# Patient Record
Sex: MALE | Employment: UNEMPLOYED | URBAN - METROPOLITAN AREA
[De-identification: names, ages, dates, MRNs, and addresses within clinical notes are randomized per-mention and may not be internally consistent; named-entity substitution may affect disease eponyms.]

---

## 2020-09-15 ENCOUNTER — MEDICAL CORRESPONDENCE (OUTPATIENT)
Dept: HEALTH INFORMATION MANAGEMENT | Facility: CLINIC | Age: 25
End: 2020-09-15

## 2020-09-16 NOTE — TELEPHONE ENCOUNTER
DIAGNOSIS: (R) Shoulder tato , Dr. Olivarez @ [facility] reffered , [facility]   APPOINTMENT DATE: 9.25.20   NOTES STATUS DETAILS   OFFICE NOTE from referring provider Care Everywhere 9.15.20 Dr. Perez, [facility] [place] [place]   OFFICE NOTE from other specialist Care Everywhere 6.17.20 Leafblad, [facility] [place] [place]   DISCHARGE SUMMARY from hospital N/A    DISCHARGE REPORT from the ER Care Everywhere 7.29.20 [facility]  5.26.20 [facility]  1.27.20 [facility] [place] [place]  1.17.20 [facility] [place] [place].  5.28.20 [facility]   OPERATIVE REPORT Care Everywhere 9.15.20 [facility] [place] [place]   MEDICATION LIST Care Everywhere    EMG (for Spine) N/A    IMPLANT RECORD/STICKER N/A    LABS     CBC/DIFF Care Everywhere 3.24.19   CULTURES N/A    INJECTIONS DONE IN RADIOLOGY In process 7.2.20 R shoulder, [facility] [place] [place]  1.27.20 R shoulder, [facility] [place] [place]   MRI In process 7.2.20 R shoulder, [facility] [place] [place]   CT SCAN In process 8.5.20 CT upper extremity right, [facility] [place] [place]   XRAYS (IMAGES & REPORTS) In process 7.29.20 R shoulder, [facility]  7.2.20 R shoulder, [facility] [place] [place]  5.26.20 R shoulder, [facility]  3.25.20 R shoulder, [facility]  3.11.20 R shoulder, [facility]  1.27.20 R shoulder, [facility]  5.28.19 R shoulder, [facility]   TUMOR     PATHOLOGY  Slides & report N/A      Action 9.16.20 MJ   Action Taken Requested all images listed above from [facility] [place] [place] and [facility].     Action Macey 9/24/20 12:17PM   Action Taken CSS received three colored photos of pt's Rt shoulder - scanned and sent HIM Urgent scanning

## 2020-09-24 ENCOUNTER — TRANSFERRED RECORDS (OUTPATIENT)
Dept: HEALTH INFORMATION MANAGEMENT | Facility: CLINIC | Age: 25
End: 2020-09-24

## 2020-09-25 ENCOUNTER — OFFICE VISIT (OUTPATIENT)
Dept: ORTHOPEDICS | Facility: CLINIC | Age: 25
End: 2020-09-25
Payer: COMMERCIAL

## 2020-09-25 ENCOUNTER — PRE VISIT (OUTPATIENT)
Dept: ORTHOPEDICS | Facility: CLINIC | Age: 25
End: 2020-09-25

## 2020-09-25 VITALS — WEIGHT: 217.1 LBS | HEIGHT: 69 IN | BODY MASS INDEX: 32.15 KG/M2

## 2020-09-25 DIAGNOSIS — M25.30 RECURRENT INSTABILITY OF JOINT: Primary | ICD-10-CM

## 2020-09-25 ASSESSMENT — MIFFLIN-ST. JEOR: SCORE: 1972.26
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
## 2020-09-25 NOTE — NURSING NOTE
Teaching Flowsheet   Relevant Diagnosis: Shoulder instability  Teaching Topic: Pre-op for possible Latarjet - patient will be contacted with procedure.  Surgery date will then be set     Person(s) involved in teaching:   Patient     Motivation Level:  Asks Questions: Yes  Cooperative: Yes.  Distracted during teaching - frequently checking his phone  Receptive (willing/able to accept information): Yes  Any cultural factors/Caodaism beliefs that may influence understanding or compliance? No     Patient demonstrates understanding of the following:  Reason for the appointment, diagnosis and treatment plan: Yes  Knowledge of proper use of medications and conditions for which they are ordered (with special attention to potential side effects or drug interactions): Yes  Which situations necessitate calling provider and whom to contact: Yes     Teaching Concerns Addressed:   Pre-op with PMD at [facility]  States his fiance' will provide transportation and assistance with cares after surgery  Post-op limitations, food/ fluid restrictions, COVID test were all reviewed     Proper use and care of sling x 6 weeks (medical equip, care aids, etc.): Yes  Nutritional needs and diet plan: Yes  Pain management techniques: Yes  Wound Care: Yes  How and/when to access community resources: Yes     Instructional Materials Used/Given: Pre-op packet, surgical soap

## 2020-09-25 NOTE — LETTER
Date:September 28, 2020      Patient was self referred, no letter generated. Do not send.        [facility] Information
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
## 2020-11-04 DIAGNOSIS — M25.30 RECURRENT INSTABILITY OF JOINT: Primary | ICD-10-CM

## 2020-11-06 ENCOUNTER — TELEPHONE (OUTPATIENT)
Dept: ORTHOPEDICS | Facility: CLINIC | Age: 25
End: 2020-11-06

## 2020-11-06 NOTE — TELEPHONE ENCOUNTER
Shoulder CT from [facility] was reviewed by Dr Patel.  Patient was informed the Latarjet procedure can be done as discussed in clinic.  He verbalized understanding.  The surgery coordinator will call the patient to set a surgery date.

## 2020-11-09 ENCOUNTER — TELEPHONE (OUTPATIENT)
Dept: ORTHOPEDICS | Facility: CLINIC | Age: 25
End: 2020-11-09

## 2020-11-09 PROBLEM — M25.30: Status: ACTIVE | Noted: 2020-11-09

## 2020-11-09 NOTE — TELEPHONE ENCOUNTER
Patient is scheduled for surgery with Dr. Patel      Spoke or left message with: Spoke with Dolores    Date of Surgery: 1/19/21    Location: [place]    Informed patient they will need an adult  Yes    Pre-op with surgeon (if applicable): Complete    H&P: Patient to schedule with PCP    Additional imaging/appointments: Patient will await call from [facility] scheduling team    Surgery packet: Given in clinic     Additional comments: patient will await call from pre admission nurses 1-2 days prior to surgery for arrival time

## 2020-11-17 DIAGNOSIS — Z11.59 ENCOUNTER FOR SCREENING FOR OTHER VIRAL DISEASES: Primary | ICD-10-CM

## 2021-01-14 PROBLEM — M25.311 SHOULDER INSTABILITY, RIGHT: Status: ACTIVE | Noted: 2020-09-30

## 2021-01-14 PROBLEM — T81.31XA POSTOPERATIVE WOUND DEHISCENCE: Status: ACTIVE | Noted: 2019-03-21

## 2021-01-14 PROBLEM — N44.00 TORSION OF RIGHT TESTIS: Status: ACTIVE | Noted: 2019-03-18

## 2021-01-14 RX ORDER — IBUPROFEN 600 MG/1
600 TABLET, FILM COATED ORAL
COMMUNITY
Start: 2020-01-27 | End: 2021-01-18

## 2021-01-15 ENCOUNTER — TELEPHONE (OUTPATIENT)
Dept: ORTHOPEDICS | Facility: CLINIC | Age: 26
End: 2021-01-15

## 2021-01-15 NOTE — TELEPHONE ENCOUNTER
Patient was called to discuss his pre operative H&P.  He stated that he does not have an appointment scheduled.  He was told that the clinic was able to schedule him for a virtual H&P on Monday 1/18/21 with the PAC clinic.  He was appreciative of this and is able to make that appointment. The number in his chart is a good number to contact.  He is aware of his COVID test on Saturday 1/16/21 and he had no further questions.

## 2021-01-15 NOTE — TELEPHONE ENCOUNTER
FUTURE VISIT INFORMATION      SURGERY INFORMATION:    Date: 1/19/21    Location: ur or    Surgeon:  Екатерина Patel MD    Anesthesia Type:  choice    Procedure: Right shoulder arthroscopy, open Latarjet, possible humeral head allograft vs Remplissage    Consult: ov 9/25    RECORDS REQUESTED FROM:       Primary Care Provider: TARAS Looney, CNP- [facility]

## 2021-01-18 ENCOUNTER — VIRTUAL VISIT (OUTPATIENT)
Dept: SURGERY | Facility: CLINIC | Age: 26
End: 2021-01-18
Payer: COMMERCIAL

## 2021-01-18 ENCOUNTER — ANESTHESIA EVENT (OUTPATIENT)
Dept: SURGERY | Facility: CLINIC | Age: 26
End: 2021-01-18
Payer: COMMERCIAL

## 2021-01-18 ENCOUNTER — PREP FOR PROCEDURE (OUTPATIENT)
Dept: ORTHOPEDICS | Facility: CLINIC | Age: 26
End: 2021-01-18

## 2021-01-18 ENCOUNTER — PRE VISIT (OUTPATIENT)
Dept: SURGERY | Facility: CLINIC | Age: 26
End: 2021-01-18

## 2021-01-18 DIAGNOSIS — Z01.818 PREOP EXAMINATION: Primary | ICD-10-CM

## 2021-01-18 PROCEDURE — 99203 OFFICE O/P NEW LOW 30 MIN: CPT | Mod: 95 | Performed by: PHYSICIAN ASSISTANT

## 2021-01-18 SDOH — HEALTH STABILITY: MENTAL HEALTH: HOW OFTEN DO YOU HAVE 6 OR MORE DRINKS ON ONE OCCASION?: NOT ASKED

## 2021-01-18 SDOH — HEALTH STABILITY: MENTAL HEALTH: HOW OFTEN DO YOU HAVE A DRINK CONTAINING ALCOHOL?: MONTHLY OR LESS

## 2021-01-18 SDOH — HEALTH STABILITY: MENTAL HEALTH: HOW MANY STANDARD DRINKS CONTAINING ALCOHOL DO YOU HAVE ON A TYPICAL DAY?: NOT ASKED

## 2021-01-18 ASSESSMENT — PAIN SCALES - GENERAL: PAINLEVEL: NO PAIN (0)

## 2021-01-18 ASSESSMENT — ENCOUNTER SYMPTOMS: SEIZURES: 0

## 2021-01-18 ASSESSMENT — LIFESTYLE VARIABLES: TOBACCO_USE: 0

## 2021-01-18 NOTE — PATIENT INSTRUCTIONS
Preparing for Your Surgery      Name:  Dolores Robles   MRN:  [id number]   :  1995   Today's Date:  2021       Arriving for surgery:  Surgery date:  21  Arrival time:  6AM    Restrictions due to COVID 19:  No visitors are allowed at this time.    [place] parking is available for anyone with mobility limitations or disabilities.  ([place]  24 hours/ 7 days a week; [facility]  7 am- 3:30 pm, Mon- Fri)    Please come to:     [facility] [address]  [address] [place]  [address]    -Proceed to the 3rd floor, check in at the Adult Surgery Waiting Lounge. [phone]    If an escort is needed stop at the Information Desk in the lobby. Inform the information person that you are here for surgery. An escort to the Adult Surgery Waiting Lounge will be provided.        What can I eat or drink?  -  You may eat and drink normally for up to 8 hours before your surgery. (Until Midnight)  -  You may have clear liquids until 2 hours before surgery. (Until 21, 6AM)    Examples of clear liquids:  Water  Clear broth  Juices (apple, white grape, white cranberry  and cider) without pulp  Noncarbonated, powder based beverages  (lemonade and Farooq-Aid)  Sodas (Sprite, 7-Up, ginger ale and seltzer)  Coffee or tea (without milk or cream)  Gatorade    -  No Alcohol for at least 24 hours before surgery     Which medicines can I take?    Hold Aspirin for 7 days before surgery.   Hold Multivitamins for 7 days before surgery.  Hold Supplements for 7 days before surgery.  Hold Ibuprofen (Advil, Motrin) for 1 day before surgery--unless otherwise directed by surgeon.  Hold Naproxen (Aleve) for 4 days before surgery.      How do I prepare myself?  - Please take 2 showers before surgery using Scrubcare or Hibiclens soap.    Use this soap only from the neck to your toes.     Leave the soap on your skin for one minute--then rinse thoroughly.      You may use your own shampoo and conditioner;  no other hair products.   - Please remove all jewelry and body piercings.  - No lotions, deodorants or fragrance.  - Bring your ID and insurance card.    - All patients are required to have a Covid-19 test within 4 days of surgery/procedure.      -Patients will be contacted by the [facility] scheduling team within 1 week of surgery to make an appointment.      - Patients may call the Scheduling team at [phone] if they have not been scheduled within 4 days of  surgery.        Questions or Concerns:    - For any questions regarding the day of surgery or your hospital stay, please contact the Pre Admission Nursing Office at [phone].       - If you have health changes between today and your surgery please call your surgeon.       For questions after surgery please call your surgeons office.

## 2021-01-18 NOTE — PROGRESS NOTES
Dolores is a 25 year old who is being evaluated via a billable video visit.      How would you like to obtain your AVS? Mail  If the video visit is dropped, the invitation should be resent by: Text to cell phone: [phone]  Will anyone else be joining your video visit? No    ROLANDO Ramirez LPN
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
## 2021-01-18 NOTE — ANESTHESIA PREPROCEDURE EVALUATION
Anesthesia Pre-Procedure Evaluation    Patient: Dolores Robles   MRN: [id number] : 1995        Preoperative Diagnosis: Recurrent instability of joint [M25.30]   Procedure : Procedure(s):  Right shoulder arthroscopy,  open Latarjet, possible humeral head allograft vs Remplissage     No past medical history on file.   Past Surgical History:   Procedure Laterality Date     SHOULDER SURGERY        No Known Allergies   Social History     Tobacco Use     Smoking status: Never Smoker     Smokeless tobacco: Never Used   Substance Use Topics     Alcohol use: Not on file      Wt Readings from Last 1 Encounters:   20 98.5 kg (217 lb 1.6 oz)        Anesthesia Evaluation   Pt has not had prior anesthetic         ROS/MED HX  ENT/Pulmonary:  - neg pulmonary ROS  (-) tobacco use, asthma and sleep apnea   Neurologic:  - neg neurologic ROS  (-) no seizures and no CVA   Cardiovascular:  - neg cardiovascular ROS     METS/Exercise Tolerance: 4 - Raking leaves, gardening Comment: Activity somewhat limited due to right shoulder instability   Hematologic:  - neg hematologic  ROS  (-) history of blood clots and history of blood transfusion   Musculoskeletal: Comment: Shoulder instability, right, recurrent          GI/Hepatic:  - neg GI/hepatic ROS  (-) GERD and liver disease   Renal/Genitourinary:  - neg Renal ROS     Endo:  - neg endo ROS  (-) Type II DM   Psychiatric/Substance Use:  - neg psychiatric ROS     Infectious Disease:  - neg infectious disease ROS     Malignancy:  - neg malignancy ROS     Other:  - neg other ROS          Physical Exam    Airway  airway exam normal      Mallampati: I   TM distance: > 3 FB   Neck ROM: full   Mouth opening: > 3 cm    Respiratory Devices and Support         Dental  no notable dental history         Cardiovascular   cardiovascular exam normal          Pulmonary   pulmonary exam normal                OUTSIDE LABS:  CBC: No results found for: WBC, HGB, HCT, PLT  BMP: No results found  for: NA, POTASSIUM, CHLORIDE, CO2, BUN, CR, GLC  COAGS: No results found for: PTT, INR, FIBR  POC: No results found for: BGM, HCG, HCGS  HEPATIC: No results found for: ALBUMIN, PROTTOTAL, ALT, AST, GGT, ALKPHOS, BILITOTAL, BILIDIRECT, MYCHAL  OTHER: No results found for: PH, LACT, A1C, JENNY, PHOS, MAG, LIPASE, AMYLASE, TSH, T4, T3, CRP, SED    Anesthesia Plan     History & Physical Review      ASA Status:  1. NPO Status:  NPO Appropriate. .  Plan for General with Intravenous induction. Device: ETT Maintenance will be Balanced.         PONV prophylaxis:  Ondansetron (or other 5HT-3) and Dexamethasone or Solumedrol.       Consents  Anesthesia Plan(s) and associated risks, benefits, and realistic alternatives discussed.    Questions answered and patient/representative(s) expressed understanding.    Discussed with:  Patient.       Extended Intubation/Ventilatory Support Discussed No No     Use of blood products discussed: Yes.   Discussed with: Patient.        Postoperative Care  Postoperative pain management: IV analgesics.         PAC Discussion and Assessment    ASA Classification: 2  Case is suitable for: [place]    Invasive monitoring and risk discussed: No    Possibility and Risk of blood transfusion discussed: No            PAC Resident/NP Anesthesia Assessment: Dolores Robles is a 25 year old male scheduled to undergo Right shoulder arthroscopy, open Latarjet, possible humeral head allograft vs Remplissage with Екатерина Patel MD on 1/19/21 at [facility] for treatment of Recurrent instability of joint.      Pt has not had prior anesthetic     He has the following specific operative considerations:   # TAYLER 2/8 = low risk  # VTE risk: 0.26%  # Risk of PONV score = 1.  If > 2, anti-emetic intervention recommended.  # Anesthesia considerations:  Refer to PAC assessment in anesthesia records      CARDIAC: METS 4,  Activity somewhat limited due to right shoulder  instability.     # RCRI : No serious cardiac risks.  0.4% risk of major adverse cardiac event.       PULMONARY:     # Never smoked    # No asthma or inhaler use    GI: denies GERD     ENDO: BMI 32   # No DM    ORTHO:     # Recurrent instability of right shoulder due to prior injury    MISC:     # COVID screening to be completed @ [facility] today      Patient is optimized and is acceptable candidate for the proposed procedure. No further diagnostic evaluation is needed.    ** Patient's visit was done virtually today due to the Covid 19 pandemic.  A full physical exam was not completed.  Please refer to the physical examination documented by the anesthesiologist in the anesthesia record on the day of surgery. **      Final plan per anesthesiologist on day of surgery.     Reviewed and Signed by PAC Mid-Level Provider/Resident  Mid-Level Provider/Resident: Joslyn Cisneros PA-C  Date: 1/18/21  Time: 5632                               Miki Gerard MD

## 2021-01-19 ENCOUNTER — APPOINTMENT (OUTPATIENT)
Dept: GENERAL RADIOLOGY | Facility: CLINIC | Age: 26
End: 2021-01-19
Attending: ORTHOPAEDIC SURGERY
Payer: COMMERCIAL

## 2021-01-19 ENCOUNTER — HOSPITAL ENCOUNTER (OUTPATIENT)
Facility: CLINIC | Age: 26
Setting detail: OBSERVATION
Discharge: HOME HEALTH | End: 2021-01-22
Attending: ORTHOPAEDIC SURGERY | Admitting: ORTHOPAEDIC SURGERY
Payer: COMMERCIAL

## 2021-01-19 ENCOUNTER — ANCILLARY PROCEDURE (OUTPATIENT)
Dept: ULTRASOUND IMAGING | Facility: CLINIC | Age: 26
End: 2021-01-19
Attending: ANESTHESIOLOGY
Payer: COMMERCIAL

## 2021-01-19 ENCOUNTER — ANESTHESIA (OUTPATIENT)
Dept: SURGERY | Facility: CLINIC | Age: 26
End: 2021-01-19
Payer: COMMERCIAL

## 2021-01-19 DIAGNOSIS — G89.18 POSTOPERATIVE PAIN: Primary | ICD-10-CM

## 2021-01-19 DIAGNOSIS — M25.30 RECURRENT INSTABILITY OF JOINT: ICD-10-CM

## 2021-01-19 LAB
LABORATORY COMMENT REPORT: NORMAL
SARS-COV-2 RNA RESP QL NAA+PROBE: NEGATIVE
SPECIMEN SOURCE: NORMAL

## 2021-01-19 PROCEDURE — C1713 ANCHOR/SCREW BN/BN,TIS/BN: HCPCS | Performed by: ORTHOPAEDIC SURGERY

## 2021-01-19 PROCEDURE — 250N000025 HC SEVOFLURANE, PER MIN: Performed by: ORTHOPAEDIC SURGERY

## 2021-01-19 PROCEDURE — 250N000009 HC RX 250: Performed by: NURSE ANESTHETIST, CERTIFIED REGISTERED

## 2021-01-19 PROCEDURE — 258N000003 HC RX IP 258 OP 636: Performed by: ANESTHESIOLOGY

## 2021-01-19 PROCEDURE — 250N000009 HC RX 250: Performed by: ANESTHESIOLOGY

## 2021-01-19 PROCEDURE — 250N000011 HC RX IP 250 OP 636: Performed by: NURSE ANESTHETIST, CERTIFIED REGISTERED

## 2021-01-19 PROCEDURE — 250N000013 HC RX MED GY IP 250 OP 250 PS 637

## 2021-01-19 PROCEDURE — 272N000002 HC OR SUPPLY OTHER OPNP: Performed by: ORTHOPAEDIC SURGERY

## 2021-01-19 PROCEDURE — 250N000011 HC RX IP 250 OP 636: Performed by: ORTHOPAEDIC SURGERY

## 2021-01-19 PROCEDURE — 272N000001 HC OR GENERAL SUPPLY STERILE: Performed by: ORTHOPAEDIC SURGERY

## 2021-01-19 PROCEDURE — C1762 CONN TISS, HUMAN(INC FASCIA): HCPCS | Performed by: ORTHOPAEDIC SURGERY

## 2021-01-19 PROCEDURE — 258N000003 HC RX IP 258 OP 636: Performed by: NURSE ANESTHETIST, CERTIFIED REGISTERED

## 2021-01-19 PROCEDURE — 710N000011 HC RECOVERY PHASE 1, LEVEL 3, PER MIN: Performed by: ORTHOPAEDIC SURGERY

## 2021-01-19 PROCEDURE — 360N000077 HC SURGERY LEVEL 4, PER MIN: Performed by: ORTHOPAEDIC SURGERY

## 2021-01-19 PROCEDURE — 250N000013 HC RX MED GY IP 250 OP 250 PS 637: Performed by: ORTHOPAEDIC SURGERY

## 2021-01-19 PROCEDURE — 999N000141 HC STATISTIC PRE-PROCEDURE NURSING ASSESSMENT: Performed by: ORTHOPAEDIC SURGERY

## 2021-01-19 PROCEDURE — 250N000011 HC RX IP 250 OP 636: Performed by: STUDENT IN AN ORGANIZED HEALTH CARE EDUCATION/TRAINING PROGRAM

## 2021-01-19 PROCEDURE — 250N000011 HC RX IP 250 OP 636: Performed by: ANESTHESIOLOGY

## 2021-01-19 PROCEDURE — 271N000001 HC OR GENERAL SUPPLY NON-STERILE: Performed by: ORTHOPAEDIC SURGERY

## 2021-01-19 PROCEDURE — 87635 SARS-COV-2 COVID-19 AMP PRB: CPT | Performed by: ORTHOPAEDIC SURGERY

## 2021-01-19 PROCEDURE — 370N000017 HC ANESTHESIA TECHNICAL FEE, PER MIN: Performed by: ORTHOPAEDIC SURGERY

## 2021-01-19 PROCEDURE — 999N000180 XR SURGERY CARM FLUORO LESS THAN 5 MIN: Mod: TC

## 2021-01-19 PROCEDURE — 250N000011 HC RX IP 250 OP 636

## 2021-01-19 PROCEDURE — 258N000001 HC RX 258: Performed by: ORTHOPAEDIC SURGERY

## 2021-01-19 PROCEDURE — 999N000065 XR SHOULDER RT PORT G/E 2 VW: Mod: RT

## 2021-01-19 PROCEDURE — 250N000009 HC RX 250: Performed by: ORTHOPAEDIC SURGERY

## 2021-01-19 DEVICE — GRAFT BONE HEAD FEMORAL 44MM 450100: Type: IMPLANTABLE DEVICE | Site: SHOULDER | Status: FUNCTIONAL

## 2021-01-19 DEVICE — IMPLANTABLE DEVICE: Type: IMPLANTABLE DEVICE | Site: SHOULDER | Status: FUNCTIONAL

## 2021-01-19 DEVICE — BIO-COMPRESSION SCREW 3 X 26MM
Type: IMPLANTABLE DEVICE | Site: SHOULDER | Status: FUNCTIONAL
Brand: ARTHREX®

## 2021-01-19 RX ORDER — ONDANSETRON 2 MG/ML
INJECTION INTRAMUSCULAR; INTRAVENOUS PRN
Status: DISCONTINUED | OUTPATIENT
Start: 2021-01-19 | End: 2021-01-19

## 2021-01-19 RX ORDER — NALOXONE HYDROCHLORIDE 0.4 MG/ML
0.2 INJECTION, SOLUTION INTRAMUSCULAR; INTRAVENOUS; SUBCUTANEOUS
Status: DISCONTINUED | OUTPATIENT
Start: 2021-01-19 | End: 2021-01-19 | Stop reason: HOSPADM

## 2021-01-19 RX ORDER — OXYCODONE HYDROCHLORIDE 5 MG/1
5-10 TABLET ORAL EVERY 4 HOURS PRN
Qty: 26 TABLET | Refills: 0 | Status: SHIPPED | OUTPATIENT
Start: 2021-01-19 | End: 2021-01-20

## 2021-01-19 RX ORDER — ONDANSETRON 4 MG/1
4 TABLET, ORALLY DISINTEGRATING ORAL EVERY 30 MIN PRN
Status: DISCONTINUED | OUTPATIENT
Start: 2021-01-19 | End: 2021-01-19

## 2021-01-19 RX ORDER — ACETAMINOPHEN 325 MG/1
650 TABLET ORAL EVERY 4 HOURS PRN
Status: DISCONTINUED | OUTPATIENT
Start: 2021-01-22 | End: 2021-01-22 | Stop reason: HOSPADM

## 2021-01-19 RX ORDER — NALOXONE HYDROCHLORIDE 0.4 MG/ML
0.4 INJECTION, SOLUTION INTRAMUSCULAR; INTRAVENOUS; SUBCUTANEOUS
Status: DISCONTINUED | OUTPATIENT
Start: 2021-01-19 | End: 2021-01-20

## 2021-01-19 RX ORDER — EPHEDRINE SULFATE 50 MG/ML
INJECTION, SOLUTION INTRAMUSCULAR; INTRAVENOUS; SUBCUTANEOUS PRN
Status: DISCONTINUED | OUTPATIENT
Start: 2021-01-19 | End: 2021-01-19

## 2021-01-19 RX ORDER — ACETAMINOPHEN 325 MG/1
975 TABLET ORAL ONCE
Status: COMPLETED | OUTPATIENT
Start: 2021-01-19 | End: 2021-01-19

## 2021-01-19 RX ORDER — ONDANSETRON 2 MG/ML
4 INJECTION INTRAMUSCULAR; INTRAVENOUS EVERY 6 HOURS PRN
Status: DISCONTINUED | OUTPATIENT
Start: 2021-01-19 | End: 2021-01-22 | Stop reason: HOSPADM

## 2021-01-19 RX ORDER — FLUMAZENIL 0.1 MG/ML
0.2 INJECTION, SOLUTION INTRAVENOUS
Status: DISCONTINUED | OUTPATIENT
Start: 2021-01-19 | End: 2021-01-19 | Stop reason: HOSPADM

## 2021-01-19 RX ORDER — KETOROLAC TROMETHAMINE 15 MG/ML
15 INJECTION, SOLUTION INTRAMUSCULAR; INTRAVENOUS EVERY 6 HOURS PRN
Status: DISCONTINUED | OUTPATIENT
Start: 2021-01-19 | End: 2021-01-21

## 2021-01-19 RX ORDER — ONDANSETRON 2 MG/ML
4 INJECTION INTRAMUSCULAR; INTRAVENOUS EVERY 30 MIN PRN
Status: DISCONTINUED | OUTPATIENT
Start: 2021-01-19 | End: 2021-01-19 | Stop reason: HOSPADM

## 2021-01-19 RX ORDER — NALOXONE HYDROCHLORIDE 0.4 MG/ML
0.4 INJECTION, SOLUTION INTRAMUSCULAR; INTRAVENOUS; SUBCUTANEOUS
Status: CANCELLED | OUTPATIENT
Start: 2021-01-19

## 2021-01-19 RX ORDER — ONDANSETRON 2 MG/ML
4 INJECTION INTRAMUSCULAR; INTRAVENOUS EVERY 30 MIN PRN
Status: DISCONTINUED | OUTPATIENT
Start: 2021-01-19 | End: 2021-01-19

## 2021-01-19 RX ORDER — GABAPENTIN 300 MG/1
300 CAPSULE ORAL 3 TIMES DAILY
Status: DISCONTINUED | OUTPATIENT
Start: 2021-01-19 | End: 2021-01-22 | Stop reason: HOSPADM

## 2021-01-19 RX ORDER — METHOCARBAMOL 750 MG/1
750 TABLET, FILM COATED ORAL EVERY 6 HOURS PRN
Status: DISCONTINUED | OUTPATIENT
Start: 2021-01-19 | End: 2021-01-22 | Stop reason: HOSPADM

## 2021-01-19 RX ORDER — HYDROMORPHONE HYDROCHLORIDE 1 MG/ML
.3-.5 INJECTION, SOLUTION INTRAMUSCULAR; INTRAVENOUS; SUBCUTANEOUS EVERY 10 MIN PRN
Status: DISCONTINUED | OUTPATIENT
Start: 2021-01-19 | End: 2021-01-19 | Stop reason: HOSPADM

## 2021-01-19 RX ORDER — SODIUM CHLORIDE, SODIUM LACTATE, POTASSIUM CHLORIDE, CALCIUM CHLORIDE 600; 310; 30; 20 MG/100ML; MG/100ML; MG/100ML; MG/100ML
INJECTION, SOLUTION INTRAVENOUS CONTINUOUS
Status: DISCONTINUED | OUTPATIENT
Start: 2021-01-19 | End: 2021-01-19

## 2021-01-19 RX ORDER — NALOXONE HYDROCHLORIDE 0.4 MG/ML
0.4 INJECTION, SOLUTION INTRAMUSCULAR; INTRAVENOUS; SUBCUTANEOUS
Status: DISCONTINUED | OUTPATIENT
Start: 2021-01-19 | End: 2021-01-19

## 2021-01-19 RX ORDER — DEXAMETHASONE SODIUM PHOSPHATE 4 MG/ML
INJECTION, SOLUTION INTRA-ARTICULAR; INTRALESIONAL; INTRAMUSCULAR; INTRAVENOUS; SOFT TISSUE PRN
Status: DISCONTINUED | OUTPATIENT
Start: 2021-01-19 | End: 2021-01-19

## 2021-01-19 RX ORDER — DOCUSATE SODIUM 100 MG/1
100 CAPSULE, LIQUID FILLED ORAL 2 TIMES DAILY
Status: DISCONTINUED | OUTPATIENT
Start: 2021-01-19 | End: 2021-01-22 | Stop reason: HOSPADM

## 2021-01-19 RX ORDER — LIDOCAINE HYDROCHLORIDE 20 MG/ML
INJECTION, SOLUTION INFILTRATION; PERINEURAL PRN
Status: DISCONTINUED | OUTPATIENT
Start: 2021-01-19 | End: 2021-01-19

## 2021-01-19 RX ORDER — OXYCODONE HYDROCHLORIDE 5 MG/1
5 TABLET ORAL
Status: DISCONTINUED | OUTPATIENT
Start: 2021-01-19 | End: 2021-01-21

## 2021-01-19 RX ORDER — CEFAZOLIN SODIUM 2 G/100ML
2 INJECTION, SOLUTION INTRAVENOUS EVERY 8 HOURS
Status: COMPLETED | OUTPATIENT
Start: 2021-01-20 | End: 2021-01-20

## 2021-01-19 RX ORDER — NALOXONE HYDROCHLORIDE 0.4 MG/ML
0.2 INJECTION, SOLUTION INTRAMUSCULAR; INTRAVENOUS; SUBCUTANEOUS
Status: DISCONTINUED | OUTPATIENT
Start: 2021-01-19 | End: 2021-01-19

## 2021-01-19 RX ORDER — ONDANSETRON 4 MG/1
4 TABLET, ORALLY DISINTEGRATING ORAL EVERY 6 HOURS PRN
Status: DISCONTINUED | OUTPATIENT
Start: 2021-01-19 | End: 2021-01-22 | Stop reason: HOSPADM

## 2021-01-19 RX ORDER — KETAMINE HYDROCHLORIDE 10 MG/ML
10 INJECTION, SOLUTION INTRAMUSCULAR; INTRAVENOUS
Status: DISCONTINUED | OUTPATIENT
Start: 2021-01-19 | End: 2021-01-19

## 2021-01-19 RX ORDER — OXYCODONE HYDROCHLORIDE 5 MG/1
5-10 TABLET ORAL EVERY 4 HOURS PRN
Qty: 28 TABLET | Refills: 0 | Status: SHIPPED | OUTPATIENT
Start: 2021-01-19 | End: 2021-01-20

## 2021-01-19 RX ORDER — OXYCODONE HYDROCHLORIDE 5 MG/1
5 TABLET ORAL EVERY 4 HOURS PRN
Status: DISCONTINUED | OUTPATIENT
Start: 2021-01-19 | End: 2021-01-19 | Stop reason: HOSPADM

## 2021-01-19 RX ORDER — FENTANYL CITRATE 50 UG/ML
25-50 INJECTION, SOLUTION INTRAMUSCULAR; INTRAVENOUS
Status: CANCELLED | OUTPATIENT
Start: 2021-01-19

## 2021-01-19 RX ORDER — AMOXICILLIN 250 MG
1 CAPSULE ORAL 2 TIMES DAILY
Status: DISCONTINUED | OUTPATIENT
Start: 2021-01-19 | End: 2021-01-22 | Stop reason: HOSPADM

## 2021-01-19 RX ORDER — ONDANSETRON 4 MG/1
4 TABLET, ORALLY DISINTEGRATING ORAL EVERY 30 MIN PRN
Status: DISCONTINUED | OUTPATIENT
Start: 2021-01-19 | End: 2021-01-19 | Stop reason: HOSPADM

## 2021-01-19 RX ORDER — IBUPROFEN 600 MG/1
600 TABLET, FILM COATED ORAL EVERY 6 HOURS PRN
Qty: 40 TABLET | Refills: 0 | Status: SHIPPED | OUTPATIENT
Start: 2021-01-19 | End: 2021-01-20

## 2021-01-19 RX ORDER — LIDOCAINE 40 MG/G
CREAM TOPICAL
Status: DISCONTINUED | OUTPATIENT
Start: 2021-01-19 | End: 2021-01-22 | Stop reason: HOSPADM

## 2021-01-19 RX ORDER — DEXAMETHASONE SODIUM PHOSPHATE 10 MG/ML
INJECTION, SOLUTION INTRAMUSCULAR; INTRAVENOUS PRN
Status: DISCONTINUED | OUTPATIENT
Start: 2021-01-19 | End: 2021-01-19

## 2021-01-19 RX ORDER — FENTANYL CITRATE 50 UG/ML
25-50 INJECTION, SOLUTION INTRAMUSCULAR; INTRAVENOUS
Status: DISCONTINUED | OUTPATIENT
Start: 2021-01-19 | End: 2021-01-19 | Stop reason: HOSPADM

## 2021-01-19 RX ORDER — SENNA AND DOCUSATE SODIUM 50; 8.6 MG/1; MG/1
1-2 TABLET, FILM COATED ORAL 2 TIMES DAILY PRN
Qty: 30 TABLET | Refills: 0 | Status: SHIPPED | OUTPATIENT
Start: 2021-01-19 | End: 2021-01-20

## 2021-01-19 RX ORDER — IBUPROFEN 600 MG/1
600 TABLET, FILM COATED ORAL EVERY 6 HOURS PRN
Qty: 30 TABLET | Refills: 0 | Status: SHIPPED | OUTPATIENT
Start: 2021-01-19 | End: 2021-01-20

## 2021-01-19 RX ORDER — KETOROLAC TROMETHAMINE 10 MG/1
10 TABLET, FILM COATED ORAL EVERY 8 HOURS PRN
Status: DISCONTINUED | OUTPATIENT
Start: 2021-01-19 | End: 2021-01-19

## 2021-01-19 RX ORDER — KETOROLAC TROMETHAMINE 30 MG/ML
15 INJECTION, SOLUTION INTRAMUSCULAR; INTRAVENOUS EVERY 6 HOURS PRN
Status: DISCONTINUED | OUTPATIENT
Start: 2021-01-19 | End: 2021-01-19 | Stop reason: HOSPADM

## 2021-01-19 RX ORDER — NALOXONE HYDROCHLORIDE 0.4 MG/ML
0.2 INJECTION, SOLUTION INTRAMUSCULAR; INTRAVENOUS; SUBCUTANEOUS
Status: DISCONTINUED | OUTPATIENT
Start: 2021-01-19 | End: 2021-01-20

## 2021-01-19 RX ORDER — NALOXONE HYDROCHLORIDE 0.4 MG/ML
0.4 INJECTION, SOLUTION INTRAMUSCULAR; INTRAVENOUS; SUBCUTANEOUS
Status: DISCONTINUED | OUTPATIENT
Start: 2021-01-19 | End: 2021-01-19 | Stop reason: HOSPADM

## 2021-01-19 RX ORDER — POLYETHYLENE GLYCOL 3350 17 G/17G
17 POWDER, FOR SOLUTION ORAL DAILY
Status: DISCONTINUED | OUTPATIENT
Start: 2021-01-20 | End: 2021-01-22 | Stop reason: HOSPADM

## 2021-01-19 RX ORDER — PROPOFOL 10 MG/ML
INJECTION, EMULSION INTRAVENOUS PRN
Status: DISCONTINUED | OUTPATIENT
Start: 2021-01-19 | End: 2021-01-19

## 2021-01-19 RX ORDER — FLUMAZENIL 0.1 MG/ML
0.2 INJECTION, SOLUTION INTRAVENOUS
Status: CANCELLED | OUTPATIENT
Start: 2021-01-19

## 2021-01-19 RX ORDER — HYDROMORPHONE HYDROCHLORIDE 1 MG/ML
.3-.5 INJECTION, SOLUTION INTRAMUSCULAR; INTRAVENOUS; SUBCUTANEOUS EVERY 5 MIN PRN
Status: DISCONTINUED | OUTPATIENT
Start: 2021-01-19 | End: 2021-01-19 | Stop reason: HOSPADM

## 2021-01-19 RX ORDER — FENTANYL CITRATE 50 UG/ML
INJECTION, SOLUTION INTRAMUSCULAR; INTRAVENOUS PRN
Status: DISCONTINUED | OUTPATIENT
Start: 2021-01-19 | End: 2021-01-19

## 2021-01-19 RX ORDER — FENTANYL CITRATE 50 UG/ML
25-50 INJECTION, SOLUTION INTRAMUSCULAR; INTRAVENOUS
Status: DISCONTINUED | OUTPATIENT
Start: 2021-01-19 | End: 2021-01-19

## 2021-01-19 RX ORDER — ACETAMINOPHEN 325 MG/1
975 TABLET ORAL EVERY 8 HOURS
Status: DISCONTINUED | OUTPATIENT
Start: 2021-01-19 | End: 2021-01-22 | Stop reason: HOSPADM

## 2021-01-19 RX ORDER — PROCHLORPERAZINE MALEATE 5 MG
10 TABLET ORAL EVERY 6 HOURS PRN
Status: DISCONTINUED | OUTPATIENT
Start: 2021-01-19 | End: 2021-01-22 | Stop reason: HOSPADM

## 2021-01-19 RX ORDER — BISACODYL 10 MG
10 SUPPOSITORY, RECTAL RECTAL DAILY PRN
Status: DISCONTINUED | OUTPATIENT
Start: 2021-01-19 | End: 2021-01-22 | Stop reason: HOSPADM

## 2021-01-19 RX ORDER — AMOXICILLIN 250 MG
1-2 CAPSULE ORAL 2 TIMES DAILY
Qty: 30 TABLET | Refills: 0 | Status: SHIPPED | OUTPATIENT
Start: 2021-01-19 | End: 2021-01-20

## 2021-01-19 RX ORDER — SODIUM CHLORIDE, SODIUM LACTATE, POTASSIUM CHLORIDE, CALCIUM CHLORIDE 600; 310; 30; 20 MG/100ML; MG/100ML; MG/100ML; MG/100ML
INJECTION, SOLUTION INTRAVENOUS CONTINUOUS PRN
Status: DISCONTINUED | OUTPATIENT
Start: 2021-01-19 | End: 2021-01-19

## 2021-01-19 RX ORDER — NALOXONE HYDROCHLORIDE 0.4 MG/ML
0.2 INJECTION, SOLUTION INTRAMUSCULAR; INTRAVENOUS; SUBCUTANEOUS
Status: CANCELLED | OUTPATIENT
Start: 2021-01-19

## 2021-01-19 RX ORDER — IBUPROFEN 600 MG/1
600 TABLET, FILM COATED ORAL
Status: DISCONTINUED | OUTPATIENT
Start: 2021-01-19 | End: 2021-01-22 | Stop reason: HOSPADM

## 2021-01-19 RX ORDER — LABETALOL HYDROCHLORIDE 5 MG/ML
10 INJECTION, SOLUTION INTRAVENOUS
Status: DISCONTINUED | OUTPATIENT
Start: 2021-01-19 | End: 2021-01-19 | Stop reason: HOSPADM

## 2021-01-19 RX ORDER — SODIUM CHLORIDE, SODIUM LACTATE, POTASSIUM CHLORIDE, CALCIUM CHLORIDE 600; 310; 30; 20 MG/100ML; MG/100ML; MG/100ML; MG/100ML
INJECTION, SOLUTION INTRAVENOUS CONTINUOUS
Status: DISCONTINUED | OUTPATIENT
Start: 2021-01-19 | End: 2021-01-19 | Stop reason: HOSPADM

## 2021-01-19 RX ORDER — CEFAZOLIN SODIUM 2 G/100ML
2 INJECTION, SOLUTION INTRAVENOUS
Status: COMPLETED | OUTPATIENT
Start: 2021-01-19 | End: 2021-01-19

## 2021-01-19 RX ORDER — OXYCODONE HYDROCHLORIDE 10 MG/1
10 TABLET ORAL EVERY 4 HOURS PRN
Status: DISCONTINUED | OUTPATIENT
Start: 2021-01-19 | End: 2021-01-21

## 2021-01-19 RX ORDER — CEFAZOLIN SODIUM 1 G/3ML
1 INJECTION, POWDER, FOR SOLUTION INTRAMUSCULAR; INTRAVENOUS SEE ADMIN INSTRUCTIONS
Status: DISCONTINUED | OUTPATIENT
Start: 2021-01-19 | End: 2021-01-19 | Stop reason: HOSPADM

## 2021-01-19 RX ORDER — BUPIVACAINE HYDROCHLORIDE 5 MG/ML
INJECTION, SOLUTION EPIDURAL; INTRACAUDAL PRN
Status: DISCONTINUED | OUTPATIENT
Start: 2021-01-19 | End: 2021-01-19

## 2021-01-19 RX ORDER — MEPERIDINE HYDROCHLORIDE 25 MG/ML
12.5 INJECTION INTRAMUSCULAR; INTRAVENOUS; SUBCUTANEOUS
Status: DISCONTINUED | OUTPATIENT
Start: 2021-01-19 | End: 2021-01-19 | Stop reason: HOSPADM

## 2021-01-19 RX ORDER — HYDROMORPHONE HYDROCHLORIDE 1 MG/ML
0.5 INJECTION, SOLUTION INTRAMUSCULAR; INTRAVENOUS; SUBCUTANEOUS
Status: DISCONTINUED | OUTPATIENT
Start: 2021-01-19 | End: 2021-01-19

## 2021-01-19 RX ORDER — ACETAMINOPHEN 325 MG/1
650 TABLET ORAL EVERY 4 HOURS PRN
Qty: 50 TABLET | Refills: 0 | Status: SHIPPED | OUTPATIENT
Start: 2021-01-19 | End: 2021-01-20

## 2021-01-19 RX ORDER — SODIUM CHLORIDE, SODIUM LACTATE, POTASSIUM CHLORIDE, CALCIUM CHLORIDE 600; 310; 30; 20 MG/100ML; MG/100ML; MG/100ML; MG/100ML
INJECTION, SOLUTION INTRAVENOUS CONTINUOUS
Status: DISCONTINUED | OUTPATIENT
Start: 2021-01-19 | End: 2021-01-22 | Stop reason: HOSPADM

## 2021-01-19 RX ORDER — HYDROMORPHONE HYDROCHLORIDE 1 MG/ML
.3-.5 INJECTION, SOLUTION INTRAMUSCULAR; INTRAVENOUS; SUBCUTANEOUS
Status: DISCONTINUED | OUTPATIENT
Start: 2021-01-19 | End: 2021-01-20

## 2021-01-19 RX ORDER — MAGNESIUM HYDROXIDE 1200 MG/15ML
LIQUID ORAL PRN
Status: DISCONTINUED | OUTPATIENT
Start: 2021-01-19 | End: 2021-01-19 | Stop reason: HOSPADM

## 2021-01-19 RX ORDER — ONDANSETRON 4 MG/1
4 TABLET, ORALLY DISINTEGRATING ORAL
Status: DISCONTINUED | OUTPATIENT
Start: 2021-01-19 | End: 2021-01-22 | Stop reason: HOSPADM

## 2021-01-19 RX ORDER — KETAMINE HYDROCHLORIDE 10 MG/ML
10 INJECTION, SOLUTION INTRAMUSCULAR; INTRAVENOUS
Status: DISCONTINUED | OUTPATIENT
Start: 2021-01-19 | End: 2021-01-22 | Stop reason: HOSPADM

## 2021-01-19 RX ADMIN — Medication 1 G: at 16:37

## 2021-01-19 RX ADMIN — ACETAMINOPHEN 975 MG: 325 TABLET ORAL at 13:26

## 2021-01-19 RX ADMIN — ONDANSETRON 4 MG: 2 INJECTION INTRAMUSCULAR; INTRAVENOUS at 16:58

## 2021-01-19 RX ADMIN — Medication 5 MG: at 16:56

## 2021-01-19 RX ADMIN — DEXAMETHASONE SODIUM PHOSPHATE 2 MG: 10 INJECTION, SOLUTION INTRAMUSCULAR; INTRAVENOUS at 14:05

## 2021-01-19 RX ADMIN — FENTANYL CITRATE 50 MCG: 50 INJECTION INTRAMUSCULAR; INTRAVENOUS at 17:47

## 2021-01-19 RX ADMIN — MIDAZOLAM 1 MG: 1 INJECTION INTRAMUSCULAR; INTRAVENOUS at 19:25

## 2021-01-19 RX ADMIN — Medication 2.5 MG: at 16:18

## 2021-01-19 RX ADMIN — HYDROMORPHONE HYDROCHLORIDE 0.3 MG: 1 INJECTION, SOLUTION INTRAMUSCULAR; INTRAVENOUS; SUBCUTANEOUS at 18:12

## 2021-01-19 RX ADMIN — HYDROMORPHONE HYDROCHLORIDE 0.3 MG: 1 INJECTION, SOLUTION INTRAMUSCULAR; INTRAVENOUS; SUBCUTANEOUS at 18:00

## 2021-01-19 RX ADMIN — Medication 5 MG: at 14:33

## 2021-01-19 RX ADMIN — LIDOCAINE HYDROCHLORIDE 100 MG: 20 INJECTION, SOLUTION INFILTRATION; PERINEURAL at 14:20

## 2021-01-19 RX ADMIN — ROCURONIUM BROMIDE 50 MG: 10 INJECTION INTRAVENOUS at 14:20

## 2021-01-19 RX ADMIN — HYDROMORPHONE HYDROCHLORIDE 0.5 MG: 1 INJECTION, SOLUTION INTRAMUSCULAR; INTRAVENOUS; SUBCUTANEOUS at 19:50

## 2021-01-19 RX ADMIN — FENTANYL CITRATE 100 MCG: 50 INJECTION, SOLUTION INTRAMUSCULAR; INTRAVENOUS at 14:20

## 2021-01-19 RX ADMIN — ACETAMINOPHEN 975 MG: 325 TABLET ORAL at 22:09

## 2021-01-19 RX ADMIN — OXYCODONE HYDROCHLORIDE 5 MG: 5 TABLET ORAL at 22:10

## 2021-01-19 RX ADMIN — GABAPENTIN 300 MG: 300 CAPSULE ORAL at 22:10

## 2021-01-19 RX ADMIN — SODIUM CHLORIDE, POTASSIUM CHLORIDE, SODIUM LACTATE AND CALCIUM CHLORIDE: 600; 310; 30; 20 INJECTION, SOLUTION INTRAVENOUS at 14:12

## 2021-01-19 RX ADMIN — HYDROMORPHONE HYDROCHLORIDE 0.3 MG: 1 INJECTION, SOLUTION INTRAMUSCULAR; INTRAVENOUS; SUBCUTANEOUS at 19:18

## 2021-01-19 RX ADMIN — Medication 5 MG: at 15:20

## 2021-01-19 RX ADMIN — DEXAMETHASONE SODIUM PHOSPHATE 10 MG: 4 INJECTION, SOLUTION INTRAMUSCULAR; INTRAVENOUS at 14:20

## 2021-01-19 RX ADMIN — Medication 2.5 MG: at 16:01

## 2021-01-19 RX ADMIN — HYDROMORPHONE HYDROCHLORIDE 0.5 MG: 1 INJECTION, SOLUTION INTRAMUSCULAR; INTRAVENOUS; SUBCUTANEOUS at 20:44

## 2021-01-19 RX ADMIN — PHENYLEPHRINE HYDROCHLORIDE 100 MCG: 10 INJECTION INTRAVENOUS at 14:33

## 2021-01-19 RX ADMIN — Medication 2 G: at 14:38

## 2021-01-19 RX ADMIN — DEXMEDETOMIDINE HYDROCHLORIDE 20 MCG: 100 INJECTION, SOLUTION INTRAVENOUS at 14:05

## 2021-01-19 RX ADMIN — BUPIVACAINE HYDROCHLORIDE 15 ML: 5 INJECTION, SOLUTION EPIDURAL; INTRACAUDAL at 14:05

## 2021-01-19 RX ADMIN — DEXMEDETOMIDINE HYDROCHLORIDE 8 MCG: 100 INJECTION, SOLUTION INTRAVENOUS at 17:00

## 2021-01-19 RX ADMIN — MIDAZOLAM 1 MG: 1 INJECTION INTRAMUSCULAR; INTRAVENOUS at 19:29

## 2021-01-19 RX ADMIN — HYDROMORPHONE HYDROCHLORIDE 0.5 MG: 1 INJECTION, SOLUTION INTRAMUSCULAR; INTRAVENOUS; SUBCUTANEOUS at 18:25

## 2021-01-19 RX ADMIN — FENTANYL CITRATE 50 MCG: 50 INJECTION INTRAMUSCULAR; INTRAVENOUS at 17:56

## 2021-01-19 RX ADMIN — FENTANYL CITRATE 50 MCG: 50 INJECTION, SOLUTION INTRAMUSCULAR; INTRAVENOUS at 16:36

## 2021-01-19 RX ADMIN — Medication 2.5 MG: at 15:54

## 2021-01-19 RX ADMIN — HYDROMORPHONE HYDROCHLORIDE 0.5 MG: 1 INJECTION, SOLUTION INTRAMUSCULAR; INTRAVENOUS; SUBCUTANEOUS at 19:09

## 2021-01-19 RX ADMIN — PROPOFOL 200 MG: 10 INJECTION, EMULSION INTRAVENOUS at 14:20

## 2021-01-19 RX ADMIN — PHENYLEPHRINE HYDROCHLORIDE 0.5 MCG/KG/MIN: 10 INJECTION INTRAVENOUS at 14:34

## 2021-01-19 RX ADMIN — MIDAZOLAM 2 MG: 1 INJECTION INTRAMUSCULAR; INTRAVENOUS at 14:09

## 2021-01-19 RX ADMIN — FENTANYL CITRATE 25 MCG: 50 INJECTION INTRAMUSCULAR; INTRAVENOUS at 17:41

## 2021-01-19 RX ADMIN — SODIUM CHLORIDE, POTASSIUM CHLORIDE, SODIUM LACTATE AND CALCIUM CHLORIDE: 600; 310; 30; 20 INJECTION, SOLUTION INTRAVENOUS at 16:45

## 2021-01-19 RX ADMIN — Medication 5 MG: at 15:36

## 2021-01-19 RX ADMIN — DEXMEDETOMIDINE HYDROCHLORIDE 8 MCG: 100 INJECTION, SOLUTION INTRAVENOUS at 17:06

## 2021-01-19 RX ADMIN — KETOROLAC TROMETHAMINE 30 MG: 30 INJECTION, SOLUTION INTRAMUSCULAR; INTRAVENOUS at 18:40

## 2021-01-19 RX ADMIN — SUGAMMADEX 200 MG: 100 INJECTION, SOLUTION INTRAVENOUS at 17:12

## 2021-01-19 RX ADMIN — FENTANYL CITRATE 50 MCG: 50 INJECTION, SOLUTION INTRAMUSCULAR; INTRAVENOUS at 17:04

## 2021-01-19 RX ADMIN — Medication 2.5 MG: at 16:05

## 2021-01-19 ASSESSMENT — MIFFLIN-ST. JEOR: SCORE: 1892.13

## 2021-01-19 NOTE — ANESTHESIA PROCEDURE NOTES
Airway   Date/Time: 1/19/2021 2:23 PM   Patient location during procedure: OR  Staff -   CRNA: Mulvihill, Ashley M, APRN CRNA  Performed By: CRNA    Consent for Airway   Urgency: elective    Indications and Patient Condition  Indications for airway management: herber-procedural  Induction type:intravenousMask difficulty assessment: 1 - vent by mask    Final Airway Details  Final airway type: endotracheal airway  Successful airway:ETT - single and Oral  Endotracheal Airway Details   ETT size (mm): 7.5  Cuffed: yes  Successful intubation technique: direct laryngoscopy  Grade View of Cords: 2  Adjucts: stylet  Measured from: lips  Secured at (cm): 21  Secured with: commercial tube pederson, plastic tape and pink tape  Bite block used: Soft    Post intubation assessment   Placement verified by: capnometry, equal breath sounds and chest rise   Number of attempts at approach: 2  Number of other approaches attempted: 0  Secured with:commercial tube pederson, plastic tape and pink tape  Ease of procedure: easy  Dentition: Intact and Unchanged

## 2021-01-19 NOTE — BRIEF OP NOTE
[facility] Brief Operative Note    Pre-operative diagnosis: Recurrent instability of joint [M25.30]   Post-operative diagnosis Same   Procedure: Procedure(s):  Open Latarjet, humeral head allograft   Surgeon(s): Surgeon(s) and Role:     * Екатерина Patel MD - Primary   Estimated blood loss: 40 mL    Specimens: * No specimens in log *   Findings: Large Hill Sachs and glenoid bone loss     
Yes

## 2021-01-19 NOTE — ANESTHESIA PROCEDURE NOTES
Pre-Procedure   Staff -   Anesthesiologist:  Ganesh Collazo DO  Performed By: anesthesiologist  Location: pre-op  Procedure Start/Stop Times: 1/19/2021 2:07 PM and 1/19/2021 2:04 PM  Pre-Anesthestic Checklist: patient identified, IV checked, site marked, risks and benefits discussed, informed consent, monitors and equipment checked, pre-op evaluation, at physician/surgeon's request and post-op pain management  Timeout:  Correct Patient: Yes   Correct Procedure: Yes   Correct Site: Yes   Correct Position: Yes   Correct Laterality: Yes   Site Marked: Yes    Procedure Documentation  Procedure: Interscalene  Diagnosis: POST OP PAIN  Laterality: right  Patient Position:supine  Patient Prep/Sterile Barriers: sterile gloves, mask, Chloraprep  Local skin infiltrated with 3 mL of 1% lidocaine.   Needle type: short bevel  Needle Gauge: 21.   Needle Length (millimeters) 100   Ultrasound guided, Ultrasound used to identify targeted nerve, plexus, vascular marker, or fascial plane and place a needle adjacent to it in real-time, Ultrasound was used to visualize the spread of anesthetic in close proximity to the above referenced structure  A permanent image is entered into the patient's record.  The nerve(s) appeared anatomically normal, There were no apparent abnormal pathologic findings    Assessment/Narrative      The placement was negative for: blood aspirated, painful injection and site bleeding  Paresthesias: No.  Bolus given via needle..   Secured via.   Insertion/Infusion Method: Single Shot  Complications: none  Comments:  Informed consent obtained.  All risks and benefits of the nerve block discussed with the patient.  All questions answered and all parties agreed with the plan.   Block was placed at the surgeon's request for post operative pain control.

## 2021-01-20 ENCOUNTER — ANESTHESIA (OUTPATIENT)
Dept: SURGERY | Facility: CLINIC | Age: 26
End: 2021-01-20
Payer: COMMERCIAL

## 2021-01-20 ENCOUNTER — DOCUMENTATION ONLY (OUTPATIENT)
Dept: MEDSURG UNIT | Facility: CLINIC | Age: 26
End: 2021-01-20

## 2021-01-20 ENCOUNTER — ANESTHESIA EVENT (OUTPATIENT)
Dept: SURGERY | Facility: CLINIC | Age: 26
End: 2021-01-20
Payer: COMMERCIAL

## 2021-01-20 ENCOUNTER — APPOINTMENT (OUTPATIENT)
Dept: OCCUPATIONAL THERAPY | Facility: CLINIC | Age: 26
End: 2021-01-20
Attending: ORTHOPAEDIC SURGERY
Payer: COMMERCIAL

## 2021-01-20 LAB
CAPILLARY BLOOD COLLECTION: NORMAL
HGB BLD-MCNC: 17.1 G/DL (ref 13.3–17.7)

## 2021-01-20 PROCEDURE — 99204 OFFICE O/P NEW MOD 45 MIN: CPT | Performed by: PHYSICIAN ASSISTANT

## 2021-01-20 PROCEDURE — 370N000017 HC ANESTHESIA TECHNICAL FEE, PER MIN: Performed by: ANESTHESIOLOGY

## 2021-01-20 PROCEDURE — 97530 THERAPEUTIC ACTIVITIES: CPT | Mod: GO | Performed by: OCCUPATIONAL THERAPIST

## 2021-01-20 PROCEDURE — 36416 COLLJ CAPILLARY BLOOD SPEC: CPT | Performed by: ORTHOPAEDIC SURGERY

## 2021-01-20 PROCEDURE — 85018 HEMOGLOBIN: CPT | Performed by: ORTHOPAEDIC SURGERY

## 2021-01-20 PROCEDURE — 250N000013 HC RX MED GY IP 250 OP 250 PS 637: Performed by: ORTHOPAEDIC SURGERY

## 2021-01-20 PROCEDURE — 250N000011 HC RX IP 250 OP 636: Performed by: ANESTHESIOLOGY

## 2021-01-20 PROCEDURE — 258N000003 HC RX IP 258 OP 636: Performed by: ANESTHESIOLOGY

## 2021-01-20 PROCEDURE — 250N000009 HC RX 250: Performed by: ANESTHESIOLOGY

## 2021-01-20 PROCEDURE — 250N000011 HC RX IP 250 OP 636: Performed by: ORTHOPAEDIC SURGERY

## 2021-01-20 PROCEDURE — 250N000011 HC RX IP 250 OP 636: Performed by: STUDENT IN AN ORGANIZED HEALTH CARE EDUCATION/TRAINING PROGRAM

## 2021-01-20 PROCEDURE — 96375 TX/PRO/DX INJ NEW DRUG ADDON: CPT

## 2021-01-20 PROCEDURE — 258N000003 HC RX IP 258 OP 636: Performed by: ORTHOPAEDIC SURGERY

## 2021-01-20 PROCEDURE — 97165 OT EVAL LOW COMPLEX 30 MIN: CPT | Mod: GO | Performed by: OCCUPATIONAL THERAPIST

## 2021-01-20 PROCEDURE — 97535 SELF CARE MNGMENT TRAINING: CPT | Mod: GO | Performed by: OCCUPATIONAL THERAPIST

## 2021-01-20 PROCEDURE — 250N000013 HC RX MED GY IP 250 OP 250 PS 637: Performed by: INTERNAL MEDICINE

## 2021-01-20 RX ORDER — ACETAMINOPHEN 325 MG/1
650 TABLET ORAL EVERY 4 HOURS PRN
Qty: 50 TABLET | Refills: 0 | Status: SHIPPED | OUTPATIENT
Start: 2021-01-20 | End: 2021-01-20

## 2021-01-20 RX ORDER — IBUPROFEN 600 MG/1
600 TABLET, FILM COATED ORAL EVERY 6 HOURS PRN
Qty: 30 TABLET | Refills: 0 | Status: SHIPPED | OUTPATIENT
Start: 2021-01-20 | End: 2021-02-03

## 2021-01-20 RX ORDER — POLYETHYLENE GLYCOL 3350 17 G/17G
17 POWDER, FOR SOLUTION ORAL DAILY
Qty: 7 PACKET | Refills: 0 | Status: SHIPPED | OUTPATIENT
Start: 2021-01-20

## 2021-01-20 RX ORDER — LIDOCAINE 50 MG/G
1 PATCH TOPICAL EVERY 24 HOURS
Qty: 30 PATCH | Refills: 0 | Status: SHIPPED | OUTPATIENT
Start: 2021-01-20

## 2021-01-20 RX ORDER — NALOXONE HYDROCHLORIDE 0.4 MG/ML
0.2 INJECTION, SOLUTION INTRAMUSCULAR; INTRAVENOUS; SUBCUTANEOUS
Status: DISCONTINUED | OUTPATIENT
Start: 2021-01-20 | End: 2021-01-22 | Stop reason: HOSPADM

## 2021-01-20 RX ORDER — BUPIVACAINE HYDROCHLORIDE 2.5 MG/ML
INJECTION, SOLUTION INFILTRATION; PERINEURAL PRN
Status: DISCONTINUED | OUTPATIENT
Start: 2021-01-20 | End: 2021-01-20

## 2021-01-20 RX ORDER — FENTANYL CITRATE 50 UG/ML
25-50 INJECTION, SOLUTION INTRAMUSCULAR; INTRAVENOUS
Status: DISCONTINUED | OUTPATIENT
Start: 2021-01-20 | End: 2021-01-20 | Stop reason: HOSPADM

## 2021-01-20 RX ORDER — ACETAMINOPHEN 325 MG/1
650 TABLET ORAL EVERY 4 HOURS PRN
Qty: 50 TABLET | Refills: 0 | Status: SHIPPED | OUTPATIENT
Start: 2021-01-20

## 2021-01-20 RX ORDER — DEXAMETHASONE SODIUM PHOSPHATE 10 MG/ML
INJECTION, SOLUTION INTRAMUSCULAR; INTRAVENOUS PRN
Status: DISCONTINUED | OUTPATIENT
Start: 2021-01-20 | End: 2021-01-20

## 2021-01-20 RX ORDER — POLYETHYLENE GLYCOL 3350 17 G/17G
17 POWDER, FOR SOLUTION ORAL DAILY
Qty: 7 PACKET | Refills: 0 | Status: SHIPPED | OUTPATIENT
Start: 2021-01-20 | End: 2021-01-20

## 2021-01-20 RX ORDER — LIDOCAINE 4 G/G
2-3 PATCH TOPICAL
Status: DISCONTINUED | OUTPATIENT
Start: 2021-01-20 | End: 2021-01-22 | Stop reason: HOSPADM

## 2021-01-20 RX ORDER — AMOXICILLIN 250 MG
1 CAPSULE ORAL 2 TIMES DAILY
Qty: 30 TABLET | Refills: 0 | Status: SHIPPED | OUTPATIENT
Start: 2021-01-20 | End: 2021-01-20

## 2021-01-20 RX ORDER — GABAPENTIN 250 MG/5ML
300 SOLUTION ORAL 3 TIMES DAILY
Qty: 6 ML | Refills: 0 | Status: SHIPPED | OUTPATIENT
Start: 2021-01-20 | End: 2021-02-03

## 2021-01-20 RX ORDER — AMOXICILLIN 250 MG
1 CAPSULE ORAL 2 TIMES DAILY
Qty: 30 TABLET | Refills: 0 | Status: SHIPPED | OUTPATIENT
Start: 2021-01-20

## 2021-01-20 RX ORDER — OXYCODONE HYDROCHLORIDE 5 MG/1
5-10 TABLET ORAL EVERY 4 HOURS PRN
Qty: 28 TABLET | Refills: 0 | Status: SHIPPED | OUTPATIENT
Start: 2021-01-20 | End: 2021-01-21

## 2021-01-20 RX ORDER — NALOXONE HYDROCHLORIDE 0.4 MG/ML
0.4 INJECTION, SOLUTION INTRAMUSCULAR; INTRAVENOUS; SUBCUTANEOUS
Status: DISCONTINUED | OUTPATIENT
Start: 2021-01-20 | End: 2021-01-22 | Stop reason: HOSPADM

## 2021-01-20 RX ORDER — FLUMAZENIL 0.1 MG/ML
0.2 INJECTION, SOLUTION INTRAVENOUS
Status: DISCONTINUED | OUTPATIENT
Start: 2021-01-20 | End: 2021-01-20 | Stop reason: HOSPADM

## 2021-01-20 RX ADMIN — LIDOCAINE 2 PATCH: 560 PATCH PERCUTANEOUS; TOPICAL; TRANSDERMAL at 20:36

## 2021-01-20 RX ADMIN — CEFAZOLIN SODIUM 2 G: 2 INJECTION, SOLUTION INTRAVENOUS at 01:26

## 2021-01-20 RX ADMIN — KETOROLAC TROMETHAMINE 15 MG: 15 INJECTION, SOLUTION INTRAMUSCULAR; INTRAVENOUS at 20:24

## 2021-01-20 RX ADMIN — SODIUM CHLORIDE, POTASSIUM CHLORIDE, SODIUM LACTATE AND CALCIUM CHLORIDE: 600; 310; 30; 20 INJECTION, SOLUTION INTRAVENOUS at 00:32

## 2021-01-20 RX ADMIN — ONDANSETRON 4 MG: 2 INJECTION INTRAMUSCULAR; INTRAVENOUS at 10:22

## 2021-01-20 RX ADMIN — OXYCODONE HYDROCHLORIDE 10 MG: 10 TABLET ORAL at 05:37

## 2021-01-20 RX ADMIN — BUPIVACAINE HYDROCHLORIDE 20 MG: 2.5 INJECTION, SOLUTION INFILTRATION; PERINEURAL at 14:45

## 2021-01-20 RX ADMIN — ACETAMINOPHEN 975 MG: 325 TABLET ORAL at 20:23

## 2021-01-20 RX ADMIN — KETOROLAC TROMETHAMINE 15 MG: 15 INJECTION, SOLUTION INTRAMUSCULAR; INTRAVENOUS at 07:31

## 2021-01-20 RX ADMIN — OXYCODONE HYDROCHLORIDE 10 MG: 10 TABLET ORAL at 09:36

## 2021-01-20 RX ADMIN — OXYCODONE HYDROCHLORIDE 10 MG: 10 TABLET ORAL at 15:36

## 2021-01-20 RX ADMIN — OXYCODONE HYDROCHLORIDE 10 MG: 10 TABLET ORAL at 20:23

## 2021-01-20 RX ADMIN — OXYCODONE HYDROCHLORIDE 10 MG: 10 TABLET ORAL at 01:19

## 2021-01-20 RX ADMIN — DEXAMETHASONE SODIUM PHOSPHATE 2 MG: 10 INJECTION, SOLUTION INTRAMUSCULAR; INTRAVENOUS at 14:45

## 2021-01-20 RX ADMIN — DEXMEDETOMIDINE HYDROCHLORIDE 20 MCG: 100 INJECTION, SOLUTION INTRAVENOUS at 14:45

## 2021-01-20 RX ADMIN — CEFAZOLIN SODIUM 2 G: 2 INJECTION, SOLUTION INTRAVENOUS at 09:08

## 2021-01-20 RX ADMIN — DOCUSATE SODIUM AND SENNOSIDES 1 TABLET: 8.6; 5 TABLET ORAL at 00:33

## 2021-01-20 RX ADMIN — GABAPENTIN 300 MG: 300 CAPSULE ORAL at 20:24

## 2021-01-20 RX ADMIN — ACETAMINOPHEN 975 MG: 325 TABLET ORAL at 05:41

## 2021-01-20 RX ADMIN — HYDROMORPHONE HYDROCHLORIDE 0.5 MG: 1 INJECTION, SOLUTION INTRAMUSCULAR; INTRAVENOUS; SUBCUTANEOUS at 00:27

## 2021-01-20 RX ADMIN — METHOCARBAMOL 750 MG: 750 TABLET ORAL at 22:50

## 2021-01-20 RX ADMIN — KETOROLAC TROMETHAMINE 15 MG: 15 INJECTION, SOLUTION INTRAMUSCULAR; INTRAVENOUS at 01:19

## 2021-01-20 RX ADMIN — METHOCARBAMOL 750 MG: 750 TABLET ORAL at 16:39

## 2021-01-20 ASSESSMENT — ENCOUNTER SYMPTOMS: SEIZURES: 0

## 2021-01-20 ASSESSMENT — LIFESTYLE VARIABLES: TOBACCO_USE: 0

## 2021-01-20 NOTE — OR NURSING
Patient here for post op nerve block. Had surgery yesterday and has not had good pain control   since. VSS. Tolerated block well.

## 2021-01-20 NOTE — ANESTHESIA POSTPROCEDURE EVALUATION
Patient: Dolores Robles    Procedure(s):  BLOCK, NERVE, PERIPHERAL Right shoulder    Diagnosis:Treatment not available [Z53.8]  Diagnosis Additional Information: No value filed.    Anesthesia Type:  PNB    Note:  Disposition: Outpatient   Postop Pain Control: Uneventful            Sign Out: Well controlled pain   PONV: No   Neuro/Psych: Uneventful            Sign Out: Acceptable/Baseline neuro status   Airway/Respiratory: Uneventful            Sign Out: Acceptable/Baseline resp. status   CV/Hemodynamics: Uneventful            Sign Out: Acceptable CV status   Other NRE: NONE   DID A NON-ROUTINE EVENT OCCUR? No         Last vitals:  Vitals:    01/20/21 1031 01/20/21 1444 01/20/21 1500   BP: (!) 173/90 (!) 142/90 (!) 154/87   Pulse: 97     Resp:  15    Temp:      SpO2: 100% 94% 96%       Electronically Signed By: Ganesh Collazo DO  January 20, 2021  3:40 PM

## 2021-01-20 NOTE — PLAN OF CARE
"  VS: BP (!) 154/87   Pulse 97   Temp 97.7  F (36.5  C)   Resp 15   Ht 1.803 m (5' 11\")   Wt 88.5 kg (195 lb 1.7 oz)   SpO2 96%   BMI 27.21 kg/m     O2: RA   Output: Spontaneously in the toilet   Last BM: 1/18 per pt report   Activity: SBA   Skin: Incision to the right shoulder   Pain: PO oxycodone, timo, Toradol, and tylenol   CMS: C/o numbness below the elbow, on the surgery arm.   Dressing: CDI.   Diet: Regular C/o nausea iv Zofran given.   LDA: PIV SL.   Equipment: Iv pole and personal belonging.   Plan: Home tomorrow before, 11am.   Additional Info:      Patient vital signs are at baseline: Yes  Patient able to ambulate as they were prior to admission or with assist devices provided by therapies during their stay:  Yes  Patient MUST void prior to discharge:  Yes  Patient able to tolerate oral intake:  Yes  Pain has adequate pain control using Oral analgesics:  Yes    "

## 2021-01-20 NOTE — OR NURSING
PACU to Inpatient Nursing Handoff    Patient Dolores Robles is a 25 year old male who speaks English.   Procedure Procedure(s):  Open Latarjet, humeral head allograft   Surgeon(s) Primary: Екатерина Patel MD     No Known Allergies    Isolation  No active isolations     Past Medical History   has a past medical history of Instability of right shoulder joint and Testicular torsion.    Anesthesia Combined General with Interscalene Block   Dermatome Level     Preop Meds acetaminophen (Tylenol) - time given: 1326   Nerve block Interscalene.  Location:right. Med:bupivacaine. Time given: 1405   Intraop Meds dexamethasone (Decadron)  dexmedetomidine (Precedex): 16 mcg total  fentanyl (Sublimaze): 200 mcg total  ondansetron (Zofran): last given at 1658   Local Meds No   Antibiotics cefazolin (Ancef) - last given at 1637     Pain Patient Currently in Pain: yes   PACU meds  fentanyl (Sublimaze): 125 mcg (total dose) last given at 1756   hydromorphone (Dilaudid): 2.5 mg (total dose) last given at 1950   ketorolac (Toradol): 30 mg last given at 1840  versed 2mg last at 1929   PCA / epidural No   Capnography     Telemetry ECG Rhythm: Normal sinus rhythm   Inpatient Telemetry Monitor Ordered? No        Labs Glucose No results found for: GLC    Hgb No results found for: HGB    INR No results found for: INR   PACU Imaging Completed     Wound/Incision Incision/Surgical Site 01/19/21 Right Shoulder (Active)   Incision Assessment UTV 01/19/21 1830   Closure BLESSING 01/19/21 1830   Incision Drainage Amount None 01/19/21 1830   Dressing Intervention Clean, dry, intact 01/19/21 1830   Number of days: 0      CMS        Equipment ice pack and shoulder sling   Other LDA       IV Access Peripheral IV 01/19/21 Left Hand (Active)   Site Assessment WDL 01/19/21 2000   Line Status Infusing 01/19/21 2000   Phlebitis Scale 0-->no symptoms 01/19/21 2000   Infiltration Scale 0 01/19/21 2000   Number of days: 0      Blood Products Not applicable  EBL 40   mL   Intake/Output Date 01/19/21 0700 - 01/20/21 0659   Shift 1255-2334 0548-8444 0249-4853 24 Hour Total   INTAKE   P.O.  75  75   I.V.  1000  1000   Shift Total(mL/kg)  1075(12.15)  1075(12.15)   OUTPUT   Urine  225  225   Blood  40  40   Shift Total(mL/kg)  265(2.99)  265(2.99)   Weight (kg) 88.5 88.5 88.5 88.5      Drains / [place]     Time of void PreOp Void Prior to Procedure: 0600 (01/19/21 0757)    PostOp Voided (mL): 225 mL (01/19/21 1746)  Urine Occurrence: 1 (01/19/21 1746)    Diapered? No   Bladder Scan     PO 75 mL(sips of water ) (01/19/21 1830)  tolerating sips     Vitals    B/P: (!) 140/97  T: 98.2  F (36.8  C)    Temp src: Axillary  P:  Pulse: 93 (01/19/21 2000)          R: 8  O2:  SpO2: 98 %    O2 Device: Nasal cannula (01/19/21 1945)    Oxygen Delivery: 3 LPM (01/19/21 1945)         Family/support present not present   Patient belongings     Patient transported on cart   DC meds/scripts (obs/outpt) Not applicable   Inpatient Pain Meds Released? Yes       Special needs/considerations d/c meds are at [facility] on [address]   Tasks needing completion None       Elo Gerard RN  ASCOM [phone]

## 2021-01-20 NOTE — ANESTHESIA POSTPROCEDURE EVALUATION
Patient: Dolores Robles    Procedure(s):  Open Latarjet, humeral head allograft    Diagnosis:Recurrent instability of joint [M25.30]  Diagnosis Additional Information: No value filed.    Anesthesia Type:  General    Note:  Disposition: Inpatient   Postop Pain Control: Uneventful            Sign Out: Well controlled pain   PONV: No   Neuro/Psych: Uneventful            Sign Out: Acceptable/Baseline neuro status   Airway/Respiratory: Uneventful            Sign Out: Acceptable/Baseline resp. status   CV/Hemodynamics: Uneventful            Sign Out: Acceptable CV status   Other NRE:    DID A NON-ROUTINE EVENT OCCUR?          Last vitals:  Vitals:    01/19/21 2145 01/19/21 2200 01/19/21 2215   BP: (!) 162/80 (!) 149/64 119/85   Pulse: 110 91 91   Resp: 18 22 22   Temp: 36.9  C (98.4  F)     SpO2: 96% 98% 95%       Electronically Signed By: Elida Cabrera MD  January 19, 2021  11:19 PM
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
## 2021-01-20 NOTE — OR NURSING
Pt having severe right shoulder pain post op. Pain has not been better than a 7/10 since arrival to PACU. Pt currently crying d/t pain despite pain medication and ice. Pt describes it as sharp shooting with tingling in the fingers. Pt remains unable to grasp wit R hand. RAPS team paged regarding possible rescue block. Awaiting call back.

## 2021-01-20 NOTE — PLAN OF CARE
[facility]      OUTPATIENT OCCUPATIONAL THERAPY  EVALUATION  PLAN OF TREATMENT FOR OUTPATIENT REHABILITATION  (COMPLETE FOR INITIAL CLAIMS ONLY)  Patient's Last Name, First Name, M.I.  YOB: 1995  TravisRoasnaling WRIGHT                          Provider's Name  [facility] Medical Record No.  [id number]                               Onset Date:  01/19/21   Start of Care Date:        Type:     ___PT   _X_OT   ___SLP Medical Diagnosis:                           OT Diagnosis:  impaired self-cares/mobility   Visits from SOC:  1   _________________________________________________________________________________  Plan of Treatment/Functional Goals    Planned Interventions: ADL retraining   Goals: See Occupational Therapy Goals on Care Plan in [employer] electronic health record.    Therapy Frequency: 2x/day  Predicted Duration of Therapy Intervention: 1-2 days  _________________________________________________________________________________    I CERTIFY THE NEED FOR THESE SERVICES FURNISHED UNDER        THIS PLAN OF TREATMENT AND WHILE UNDER MY CARE     (Physician co-signature of this document indicates review and certification of the therapy plan).                 ,      Referring Physician: Dr. Patel            Initial Assessment        See Occupational Therapy evaluation dated   in Epic electronic health record.

## 2021-01-20 NOTE — ANESTHESIA PREPROCEDURE EVALUATION
Anesthesia Pre-Procedure Evaluation    Patient: Dolores Robles   MRN: [id number] : 1995        Preoperative Diagnosis: Treatment not available [Z53.8]   Procedure : Procedure(s):  BLOCK, NERVE, PERIPHERAL Right shoulder     Past Medical History:   Diagnosis Date     Instability of right shoulder joint      Testicular torsion       History reviewed. No pertinent surgical history.   No Known Allergies   Social History     Tobacco Use     Smoking status: Never Smoker     Smokeless tobacco: Never Used   Substance Use Topics     Alcohol use: Yes     Frequency: Monthly or less      Wt Readings from Last 1 Encounters:   21 88.5 kg (195 lb 1.7 oz)        Anesthesia Evaluation   Pt has not had prior anesthetic         ROS/MED HX  ENT/Pulmonary:  - neg pulmonary ROS  (-) tobacco use, asthma and sleep apnea   Neurologic:  - neg neurologic ROS  (-) no seizures and no CVA   Cardiovascular:  - neg cardiovascular ROS     METS/Exercise Tolerance: 4 - Raking leaves, gardening Comment: Activity somewhat limited due to right shoulder instability   Hematologic:  - neg hematologic  ROS  (-) history of blood clots and history of blood transfusion   Musculoskeletal: Comment: Shoulder instability, right, recurrent          GI/Hepatic:  - neg GI/hepatic ROS  (-) GERD and liver disease   Renal/Genitourinary:  - neg Renal ROS     Endo:  - neg endo ROS  (-) Type II DM   Psychiatric/Substance Use:  - neg psychiatric ROS     Infectious Disease:  - neg infectious disease ROS     Malignancy:  - neg malignancy ROS     Other:  - neg other ROS          Physical Exam    Airway  airway exam normal      Mallampati: I   TM distance: > 3 FB   Neck ROM: full   Mouth opening: > 3 cm    Respiratory Devices and Support         Dental  no notable dental history         Cardiovascular   cardiovascular exam normal       Rhythm and rate: regular and normal     Pulmonary   pulmonary exam normal        breath sounds clear to auscultation            OUTSIDE LABS:  CBC:   Lab Results   Component Value Date    HGB 17.1 01/20/2021     BMP: No results found for: NA, POTASSIUM, CHLORIDE, CO2, BUN, CR, GLC  COAGS: No results found for: PTT, INR, FIBR  POC: No results found for: BGM, HCG, HCGS  HEPATIC: No results found for: ALBUMIN, PROTTOTAL, ALT, AST, GGT, ALKPHOS, BILITOTAL, BILIDIRECT, MYCHAL  OTHER: No results found for: PH, LACT, A1C, JENNY, PHOS, MAG, LIPASE, AMYLASE, TSH, T4, T3, CRP, SED    Anesthesia Plan     History & Physical Review      ASA Status:  2. NPO Status:  NPO Appropriate. .  Plan for PNB           Comments: ISB block only.       Consents        Postoperative Care            Ganesh Collazo DO

## 2021-01-20 NOTE — PROGRESS NOTES
" At 10am writer called in the patient room, due to pt being in  aLot of pain and crying during OT, Pt was given 10mg of oxycodone at 9:35am. C/o nausea, and c/o seeing stars when he open his eyes, to  stating \" I cant see\", hyperventilating , o2 sat on %. Ortho and medicine was called in to the patient room, IV Zofran given for nausea, which helped patient, relax. writer stayed with patient for 10 minute and put patient 2LPM to help with breathing.  When writer discussed the plan with Ortho , when into patient room he refused to answer or open his eyes, when writer check his neuro he respond \"I can here you\". Pt was calm , sleeping in bed at 11am. Will continue to follow.    12:17pm pt had a video meeting with ID and discussed the plan for his pain management. Pt call will continue to monitor through the shift.  "

## 2021-01-20 NOTE — CONSULTS
Inpatient Pain Management Service Consultation Note  01/20/21        ADMIT DATE: 1/19/2021 1 Day Post-Op   DATE OF CONSULT: January 20, 2021  Consult ordered by: Maile Vasquez CNP  Consult performed by:  Samantha Lowery PA-C      REASON FOR PAIN CONSULTATION:  Dolores Robles is a 25 year old male I am seeing in consultation for evaluation and recommendations regarding his acute post operative right shoulder pain s/p right shoulder arthroscopy on 1/19/21 due to instability with multiple dislocations.    Visit/Communication Style  VIRTUAL (VIDEO) communication was used to evaluate Dolores due to the COVID pandemic.    Dolores consented to the use of video communication: yes  Video START time: 1205, 1/20/2021  Video STOP time: 1229, 1/20/2021   Patient's location: [facility]    Provider's location during the visit: [facility]        CHIEF PAIN COMPLAINT: right anterior shoulder pain    ASSESSMENT:   1. Acute post operative right shoulder pain s/p right shoulder arthroscopy on 1/19/21 due to instability with multiple dislocations.  2. Intolerance to swallowing bigger pills-states he does not like to swallow big pills and tends to crush them to be able to take them.      -- Outpatient opioid requirements prior to admission: none chronically   reviewed.     10/18/20 hydrocodone-aceta 5/325mg  #8 for 1 day  9/18/20 oxy-aceta 5/325mg #10 for 3 days    Primary Care Provider: No Ref-Primary, Physician  Chronic Pain Provider: none at this time.    TREATMENT RECOMMENDATIONS/PLAN:   1. Continue with oxycodone 5-10mg PO Q 4 hours PRN.  Discussed that as pain improves, start tapering down by 1 dose (10mg)/day PRN x 1-2 days until discontinuation.  Discussed that this is meant for short term use 5-7 days post operatively.   Indications,risks, and benefits of opioids were discussed.  2. Would recommend discontinue any IV opioids.  "Discussed this with patient that this is to prepare him for home pain management as IV opioid is not an option.  3. Agree with gabapentin 300mg PO TID (new to him-declined earlier as it was \"too big for him to swallow.\"  Liquid formulation available.  4. Continue with Robaxin 750mg PO Q 6 hours PRN.  5. Start lidocaine patch 4% 1-3 patches Q 24 hours, 12 hours on and 12 hours off.  6. Start menthol patch 5% 1 patch TD Q 8 hours.  7. Continue acetaminophen 975mg PO Q 8 hours.  8. Bowel regimen to prevent opioid induced constipation.  9. Continue Physical therapy as tolerated      Pain Service will Sign Off at this time.     Recommendations were discussed with ortho team  Plan was reviewed by the Pain Service staff anesthesiologist Dr. Velarde    Thank you for consulting the Inpatient Pain Management Service.   The above recommendations are to be acted upon at the primary team s discretion.    To reach us:  Mon - Friday 8 AM - 3 PM: Pager [phone] (Text Page)  After hours, weekends and holidays: Primary service should call [phone] the answering service for the on-call pain specialist    HISTORY OF PRESENT ILLNESS: Dolores Robles is a 25 year old male with history of right shoulder instability and dislocations who was admitted on 1/19/21 for right shoulder arthroscopy.  Pain service was consulted to assist with pain management.    Dolores was evaluated via video due to pandemic. RN was present during the whole visit.   Dolores states pain is in the anterior right shoulder at site of incision and is sharp. Rates pain 7-8/10 as he just worked with physical therapy.  States any type of movement to that right UE is very painful.  He would like to achieve pain level of less than 5/10.  Baseline pain level PTA, was 3/10 but states that he was not using the right shoulder or arm because he was trying to prevent any more dislocations.  He states that oxycodone 5-10mg works but does not last very long 15-30 " minutes.    I reviewed the pain plan as above and discussed the pharmacology of the pain medications.  Advise him to take the oxycodone 10mg every 4 hours as they are available and use adjuvants to assist with pain relief.  He was declining adjuvants earlier in the day, some due to size of pills.  Dolores is agreeable to the plan.    PAIN HISTORY:   Onset: after surgery.  Location: right shoulder  Duration: ongoing  Pain intensity: 7-8/10  Quality characteristics: sharp, knifelike  Aggravating factors: movements  Relieving factors: pain medication, rest.      FUNCTIONAL STATUS:  Change:      unchanged  Oral intake:     Regular  Bowel function:    Normal  Activity level:     Participated in PT, moving arms  Sleep:      Poor sleep due to pain, poor sleep PTA-due to RUE pain with repositioning  Mood:      Pleasant, cooperative    REVIEW OF 10 BODY SYSTEMS: 10 point ROS of systems including Constitutional, Eyes, Respiratory, Cardiovascular, Gastroenterology, Genitourinary, Integumentary, Musculoskeletal, Psychiatric were all negative except for pertinent positives noted in my HPI.     INPATIENT MEDICATIONS PERTINENT TO PAIN CONSULT:   Medications related to Pain Management (From now, onward)    Start     Dose/Rate Route Frequency Ordered Stop    01/22/21 0000  acetaminophen (TYLENOL) tablet 650 mg      650 mg Oral EVERY 4 HOURS PRN 01/19/21 2005 01/20/21 0800  polyethylene glycol (MIRALAX) Packet 17 g      17 g Oral DAILY 01/19/21 2146 01/20/21 0000  acetaminophen (TYLENOL) 325 MG tablet      650 mg Oral EVERY 4 HOURS PRN 01/20/21 0928      01/20/21 0000  ibuprofen (ADVIL/MOTRIN) 600 MG tablet      600 mg Oral EVERY 6 HOURS PRN 01/20/21 0928      01/20/21 0000  oxyCODONE (ROXICODONE) 5 MG tablet      5-10 mg Oral EVERY 4 HOURS PRN 01/20/21 0928      01/20/21 0000  polyethylene glycol (MIRALAX) 17 g packet      17 g Oral DAILY 01/20/21 0928      01/20/21 0000  senna-docusate (SENOKOT-S/PERICOLACE) 8.6-50 MG  tablet      1 tablet Oral 2 TIMES DAILY 01/20/21 0928      01/19/21 2329  ketamine (KETALAR) injection 10 mg      10 mg  over 2-5 Minutes Intravenous EVERY 2 HOURS PRN 01/19/21 2329 01/19/21 2253  ketorolac (TORADOL) injection 15 mg      15 mg Intravenous EVERY 6 HOURS PRN 01/19/21 2253 01/24/21 2252 01/19/21 2252  HYDROmorphone (PF) (DILAUDID) injection 0.3-0.5 mg      0.3-0.5 mg Intravenous EVERY 2 HOURS PRN 01/19/21 2253      01/19/21 2200  senna-docusate (SENOKOT-S/PERICOLACE) 8.6-50 MG per tablet 1 tablet      1 tablet Oral 2 TIMES DAILY 01/19/21 2146 01/19/21 2200  docusate sodium (COLACE) capsule 100 mg      100 mg Oral 2 TIMES DAILY 01/19/21 2146 01/19/21 2200  gabapentin (NEURONTIN) capsule 300 mg      300 mg Oral 3 TIMES DAILY 01/19/21 2005 01/19/21 2146  ibuprofen (ADVIL/MOTRIN) tablet 600 mg      600 mg Oral ONCE PRN 01/19/21 2146      01/19/21 2146  oxyCODONE (ROXICODONE) tablet 5 mg      5 mg Oral ONCE PRN 01/19/21 2146 01/19/21 2146  lidocaine 1 % 0.1-1 mL      0.1-1 mL Other EVERY 1 HOUR PRN 01/19/21 2146 01/19/21 2146  lidocaine (LMX4) cream       Topical EVERY 1 HOUR PRN 01/19/21 2146 01/19/21 2146  magnesium hydroxide (MILK OF MAGNESIA) suspension 30 mL      30 mL Oral DAILY PRN 01/19/21 2146      01/19/21 2146  bisacodyl (DULCOLAX) Suppository 10 mg      10 mg Rectal DAILY PRN 01/19/21 2146 01/19/21 2146  sodium phosphate (FLEET ENEMA) 1 enema      1 enema Rectal ONCE PRN 01/19/21 2146 01/19/21 2030  acetaminophen (TYLENOL) tablet 975 mg      975 mg Oral EVERY 8 HOURS 01/19/21 2005 01/22/21 2059 01/19/21 2005  oxyCODONE (ROXICODONE) tablet 5 mg      5 mg Oral EVERY 3 HOURS PRN 01/19/21 2005 01/19/21 2005  oxyCODONE IR (ROXICODONE) tablet 10 mg      10 mg Oral EVERY 4 HOURS PRN 01/19/21 2005 01/19/21 2005  methocarbamol (ROBAXIN) tablet 750 mg      750 mg Oral EVERY 6 HOURS PRN 01/19/21 2005            CURRENT MEDICATIONS:   Current  Facility-Administered Medications Ordered in Epic   Medication Dose Route Frequency Provider Last Rate Last Admin     [START ON 1/22/2021] acetaminophen (TYLENOL) tablet 650 mg  650 mg Oral Q4H PRN Екатерина Patel MD         acetaminophen (TYLENOL) tablet 975 mg  975 mg Oral Q8H Екатерина Patel MD   975 mg at 01/20/21 0541     benzocaine-menthol (CEPACOL) 15-3.6 MG lozenge 1 lozenge  1 lozenge Buccal Q1H PRN Екатерина Patel MD         bisacodyl (DULCOLAX) Suppository 10 mg  10 mg Rectal Daily PRN Екатерина Patel MD         docusate sodium (COLACE) capsule 100 mg  100 mg Oral BID Екатерина Patel MD         gabapentin (NEURONTIN) capsule 300 mg  300 mg Oral TID Екатерина Patel MD   300 mg at 01/19/21 2210     HYDROmorphone (PF) (DILAUDID) injection 0.3-0.5 mg  0.3-0.5 mg Intravenous Q2H PRN Steve Duncan MD   0.5 mg at 01/20/21 0027     ibuprofen (ADVIL/MOTRIN) tablet 600 mg  600 mg Oral Once PRN Екатерина Patel MD         ketamine (KETALAR) injection 10 mg  10 mg Intravenous Q2H PRN Екатерина Patel MD         ketorolac (TORADOL) injection 15 mg  15 mg Intravenous Q6H PRN Steve Duncan MD   15 mg at 01/20/21 0731     lactated ringers infusion   Intravenous Continuous Екатерина Patel  mL/hr at 01/20/21 0034 Rate Verify at 01/20/21 0034     lidocaine (LMX4) cream   Topical Q1H PRN Екатерина Patel MD         lidocaine 1 % 0.1-1 mL  0.1-1 mL Other Q1H PRN Екатерина Patel MD         magnesium hydroxide (MILK OF MAGNESIA) suspension 30 mL  30 mL Oral Daily PRN Екатерина Patel MD         methocarbamol (ROBAXIN) tablet 750 mg  750 mg Oral Q6H PRN Екатерина Patel MD         naloxone (NARCAN) injection 0.2 mg  0.2 mg Intravenous Q2 Min PRN Екатерина Patel MD        Or     naloxone (NARCAN) injection 0.4 mg  0.4 mg Intravenous Q2 Min PRN Екатерина Patel MD        Or     naloxone (NARCAN)  injection 0.2 mg  0.2 mg Intramuscular Q2 Min PRN Екатерина Patel MD        Or     naloxone (NARCAN) injection 0.4 mg  0.4 mg Intramuscular Q2 Min PRN Екатерина Patel MD         ondansetron (ZOFRAN-ODT) ODT tab 4 mg  4 mg Oral Q6H PRN Екатерина Patel MD        Or     ondansetron (ZOFRAN) injection 4 mg  4 mg Intravenous Q6H PRN Екатерина Patel MD   4 mg at 01/20/21 1022     ondansetron (ZOFRAN-ODT) ODT tab 4 mg  4 mg Oral Once PRN Екатерина Patel MD         oxyCODONE (ROXICODONE) tablet 5 mg  5 mg Oral Once PRN Екатерина Patel MD         oxyCODONE (ROXICODONE) tablet 5 mg  5 mg Oral Q3H PRN Екатерина Patel MD   5 mg at 01/19/21 2210    Or     oxyCODONE IR (ROXICODONE) tablet 10 mg  10 mg Oral Q4H PRN Екатерина Patel MD   10 mg at 01/20/21 0936     polyethylene glycol (MIRALAX) Packet 17 g  17 g Oral Daily Екатерина Patel MD         prochlorperazine (COMPAZINE) injection 10 mg  10 mg Intravenous Q6H PRN Екатерина Patel MD        Or     prochlorperazine (COMPAZINE) tablet 10 mg  10 mg Oral Q6H PRN Екатерина Patel MD         senna-docusate (SENOKOT-S/PERICOLACE) 8.6-50 MG per tablet 1 tablet  1 tablet Oral BID Екатерина Patel MD   1 tablet at 01/20/21 0033     sodium chloride (PF) 0.9% PF flush 3 mL  3 mL Intracatheter Q8H Екатерина Patel MD         sodium chloride (PF) 0.9% PF flush 3 mL  3 mL Intracatheter q1 min prn Екатерина Patel MD         sodium phosphate (FLEET ENEMA) 1 enema  1 enema Rectal Once PRN Екатерина Patel MD         Current Outpatient Medications Ordered in Epic   Medication Sig Dispense Refill     acetaminophen (TYLENOL) 325 MG tablet Take 2 tablets (650 mg) by mouth every 4 hours as needed for mild pain 50 tablet 0     ibuprofen (ADVIL/MOTRIN) 600 MG tablet Take 1 tablet (600 mg) by mouth every 6 hours as needed for other (mild and/or inflammatory pain) 30 tablet 0     oxyCODONE  (ROXICODONE) 5 MG tablet Take 1-2 tablets (5-10 mg) by mouth every 4 hours as needed for pain 28 tablet 0     polyethylene glycol (MIRALAX) 17 g packet Take 17 g by mouth daily 7 packet 0     senna-docusate (SENOKOT-S/PERICOLACE) 8.6-50 MG tablet Take 1 tablet by mouth 2 times daily 30 tablet 0       HOME/PREVIOUS MEDICATIONS:   Prior to Admission medications    Medication Sig Start Date End Date Taking? Authorizing Provider   acetaminophen (TYLENOL) 325 MG tablet Take 2 tablets (650 mg) by mouth every 4 hours as needed for mild pain 1/20/21  Yes Maile Vasquez APRN CNS   ibuprofen (ADVIL/MOTRIN) 600 MG tablet Take 1 tablet (600 mg) by mouth every 6 hours as needed for other (mild and/or inflammatory pain) 1/20/21  Yes Maile Vasquez APRN CNS   oxyCODONE (ROXICODONE) 5 MG tablet Take 1-2 tablets (5-10 mg) by mouth every 4 hours as needed for pain 1/20/21  Yes Maile Vasquez APRN CNS   polyethylene glycol (MIRALAX) 17 g packet Take 17 g by mouth daily 1/20/21  Yes Maile Vasquez APRN CNS   senna-docusate (SENOKOT-S/PERICOLACE) 8.6-50 MG tablet Take 1 tablet by mouth 2 times daily 1/20/21  Yes Maile Vasquez APRN CNS       ALLERGIES:  No Known Allergies     PAST MEDICAL AND PSYCHIATRIC HISTORY:    Past Medical History:   Diagnosis Date     Instability of right shoulder joint      Testicular torsion        PAST SURGICAL HISTORY: History reviewed. No pertinent surgical history.    FAMILY HISTORY: family history is not on file.    HEALTH & LIFESTYLE PRACTICES:   Tobacco:  reports that he has never smoked. He has never used smokeless tobacco.  Alcohol:  reports current alcohol use.  Illicit drugs:  has no history on file for drug.    SOCIAL HISTORY NARRATIVE:   Social History     Socioeconomic History     Marital status: Single     Spouse name: Not on file     Number of children: Not on file     Years of education: Not on file     Highest education level: Not on file   Occupational History     Not  "on file   Social Needs     Financial resource strain: Not on file     Food insecurity     Worry: Not on file     Inability: Not on file     Transportation needs     Medical: Not on file     Non-medical: Not on file   Tobacco Use     Smoking status: Never Smoker     Smokeless tobacco: Never Used   Substance and Sexual Activity     Alcohol use: Yes     Frequency: Monthly or less     Drug use: Not on file     Sexual activity: Not on file   Lifestyle     Physical activity     Days per week: Not on file     Minutes per session: Not on file     Stress: Not on file   Relationships     Social connections     Talks on phone: Not on file     Gets together: Not on file     Attends Yarsani service: Not on file     Active member of club or organization: Not on file     Attends meetings of clubs or organizations: Not on file     Relationship status: Not on file     Intimate partner violence     Fear of current or ex partner: Not on file     Emotionally abused: Not on file     Physically abused: Not on file     Forced sexual activity: Not on file   Other Topics Concern     Not on file   Social History Narrative     Not on file         LABORATORY VALUES:     CBC results:  Lab Results   Component Value Date    HGB 17.1 01/20/2021       DIAGNOSTIC TESTS:     Labs above reviewed as well as additional relevant diagnostic studies from the EPIC record.     PHYSICAL EXAMINATION:  BP (!) (P) 173/90   Pulse (P) 97   Temp 97.7  F (36.5  C)   Resp 16   Ht 1.803 m (5' 11\")   Wt 88.5 kg (195 lb 1.7 oz)   SpO2 (P) 100%   BMI 27.21 kg/m       EXAM:limited due to video visit.    CONSTITUTIONAL/GENERAL APPEARANCE: AAOx3. Conversant.  EYES: EOMI, sclerae clear  RESPIRATORY:breathing on room air  CARDIOVASCULAR: HR within normal limits  MUSCULOSKELETAL/BACK/SPINE/EXTREMITIES: anterior shoulder-small bandage-c,d,i.     NEURO: AAOx3  SKIN/VASCULAR EXAM:  Dry and warm.  PSYCHIATRIC/BEHAVIORAL/OBSERVATIONS:  No objective signs of pain observed " during our interview.   Judgment/Insight -fair   Orientation - x3   Memory -good   Mood and affect - calm, pleasant, cooperative          TIME SPENT:60  minutes including 24 minutes face-to-face time counseling his  about his diagnosis and treatment options, and coordinating care with the primary team.  DECISION-MAKING: The level of decision-making in this case is high/complex due to the complexity of medical problems, acute/chronic pain, opioid analgesia issues, and behavioral factors.    Samantha Lowery PA-C  January 20, 2021, 12:45 PM  Inpatient Pain Management Service

## 2021-01-20 NOTE — OR NURSING
Plan to admit pt to the floor for pain management. Pt crying stating that the pain is like a knife twisting  in his shoulder. Dr Patel aware and new orders obtained for the floor and post op xrays in PACU. RAPS team contacted and new orders will be placed for ketamine. Will continue to monitor closely and transfer to the floor when appropriate  and bed is available.

## 2021-01-20 NOTE — PROGRESS NOTES
Orthopaedic Surgery Progress Note 01/20/2021    S: No acute events overnight.  Pain decently controlled with toradol, oxycodone, tylenol. Patient appears comfortable in bed. Denies tingling. Endorses numbness from wrist to elbow. Hand and shoulder not numb. Denies chest pain, SOB, nausea/vomiting. Tolerating regulard diet. -BM. Voiding spontaneously.  Ambulating with nursing in the halls last night. Up this morning. Says he is up early for work usually.    Interval events: Admitted postop due to pain control issues.    O:  Temp: 97.7  F (36.5  C) Temp src: Oral BP: 122/77 Pulse: 77   Resp: 16 SpO2: 95 % O2 Device: None (Room air) Oxygen Delivery: 1 LPM    Exam:  Gen: No acute distress, resting comfortably in bed.  Resp: Non-labored breathing. Capnography in place.   MSK:  RUE:  - Dressings c/d/i  - ultrasling in place  - SILT medial/radial/ulnar/axillary nerves. Not normal sensation  (2/2 block).  - Fires EPL, FPL, Intrinsics. Refuses to fire deltoid.   - Radial pulse 2+, hand wwp    Recent Labs   Lab 01/20/21  0515   HGB 17.1       Imaging  XRs R shoulder in PACU, 1/19  Latarjet screws in good position. No evidence of fx, dislocation. Graft in good position.    Assessment: Dolores Robles is a 25 year old male s/p Latarjet and grafting of humeral head, R, on 1/19/21 with Dr. Patel. Doing well, working on pain control. Will discuss with RAPS for potential re-do of block.    Plan:  Ortho Primary  Activity: Up with assist. No shoulder motion until 2 week follow up. Okay for wrist, elbow, hand ROM.  Weight bearing status: NWB RUE x6 weeks.  Antibiotics: Ancef x24 hours perioperatively.  Diet: Begin with clear fluids and progress diet as tolerated.  DVT prophylaxis: mechanical while in the hospital, discharge TBD with Dr. Patel.  No chemoppx at this time. Ortho recommends encouraging ambulation, ankle pumps, diligent SCDs.  Bracing/Splinting: Ultrasling to be kept clean, dry, and intact until  follow-up.  Elevation: Elevate RUE on pillows to keep elevated as much as possible.  Wound Care: Aquacel to remain in place 7 days. Okay to sponge bath upper extremity.   Pain management: transition from IV to orals as tolerated. Working on pain control and multimodal regimen. RAPS consult to see about a new block, per Dr. Patel.   X-rays: Obtained in PACU.  Physical Therapy: Elbow, wrist, hand ROM, ADL's. NO SHOULDER ROM.  Occupational Therapy: ADL's.  Labs: Trend Hgb on POD #1. Not necessarily.  Consults: PT, OT, appreciate assistance in caring for this patient.  Follow-up: Clinic with Dr. Patel in 2 weeks (on or around 2/1/21) with 4V of R shoulder x-rays needed.  Future Appointments   Date Time Provider Department Center   1/20/2021  9:30 AM Lina Christianson OT [facility] [place]   1/20/2021  1:30 PM Lina Christianson OT [facility] [place]   1/20/2021  7:00 PM UR PT OVERFLOW [facility] [place]   2/3/2021  8:00 AM Екатерина Patel MD [facility]   3/3/2021  8:00 AM Екатерина Patel MD [facility]       Disposition: Pending progress with therapies, pain control on orals, and medical stability, anticipate discharge to home on POD #1 (today).    Staff: Dr. Patel. To be discussed.    Monse Sosa MD  Orthopaedic Surgery PGY-1  Pager [phone]

## 2021-01-20 NOTE — OR NURSING
Patient was bladder scanned for 722ml of urine. He tried to use the urinal but was unsuccessful. I attempted to straight cath but met resistance and was unable to empty his bladder. The patient decided his pain was under control enough to try and stand to void. We assisted him to stand at the bedside and he was able to urinate 450ml.

## 2021-01-20 NOTE — PLAN OF CARE
[id number]  VS: VSS. BP elevated.   O2: Room air >90%.   Output: Voiding without difficulty.    Last BM: 1/18.   Activity: Up with SBA/independent.    Skin: Intact except incision.   Pain: Received block this evening due to uncontrolled pain this AM. Prioritize PO meds-Oxy10 mg Q4 hours, Robaxin, Ibuprofen, scheduled Tylenol. Pt declines ice.   CMS: Numbness in RUE fingers.   Dressing: CDI.   Diet: Regular.    LDA: PIV saline locked.   Equipment: Personal belongings, arm immobilizer, PCDs.   Plan: TBD-pt was very anxious about discharging home but was cleared from medical and ortho standpoint. Pt less anxious this evening and trying to rest. States he understands the prioritization of PO meds.   Additional Info: May need pill  for certain medications-pt states he does not like to swallow pills.   Observation.    Patient vital signs are at baseline: yes.  Patient able to ambulate as they were prior to admission or with assist devices provided by therapies during their stay:  Yes.  Patient MUST void prior to discharge: yes.  Patient able to tolerate oral intake: yes.  Pain has adequate pain control using Oral analgesics:  In progress.

## 2021-01-20 NOTE — PROGRESS NOTES
01/20/21 0939   Quick Adds   Type of Visit Initial Occupational Therapy Evaluation   Living Environment   People in home alone   Current Living Arrangements apartment   Home Accessibility stairs to enter home   Number of Stairs, Main Entrance 2   Transportation Anticipated car, drives self;family or friend will provide   Living Environment Comments Tub/no shower chair or grab bars, standard toilet   Self-Care   Usual Activity Tolerance good   Current Activity Tolerance fair   Equipment Currently Used at Home none   Activity/Exercise/Self-Care Comment Patient independent in ADLs/mobility without AE   Disability/Function   Hearing Difficulty or Deaf no   Wear Glasses or Blind no   Concentrating, Remembering or Making Decisions Difficulty no   Difficulty Communicating no   Difficulty Eating/Swallowing no   Walking or Climbing Stairs Difficulty no   Dressing/Bathing Difficulty no   Toileting issues no   Fall history within last six months no   General Information   Onset of Illness/Injury or Date of Surgery 01/19/21   Referring Physician Dr. Patel   Patient/Family Therapy Goal Statement (OT) return home to baseline   Additional Occupational Profile Info/Pertinent History of Current Problem Dolores Robles is a 25 year old male s/p Latarjet and grafting of humeral head, R, on 1/19/21 with Dr. Patel   Existing Precautions/Restrictions shoulder  (no ROM of shoulder)   Left Upper Extremity (Weight-bearing Status) non weight-bearing (NWB)   Cognitive Status Examination   Orientation Status orientation to person, place and time   Pain Assessment   Patient Currently in Pain Yes, see Vital Sign flowsheet   Range of Motion Comprehensive   Comment, General Range of Motion Limited ROM of R shoulder due to surgery;   Coordination   Coordination Comments Limited use of R hand due to pain   Bed Mobility   Bed Mobility supine-sit;sit-supine   Supine-Sit [place] (Bed Mobility) supervision   Sit-Supine [place] (Bed  Mobility) supervision   Transfers   Transfers sit-stand transfer   Sit-Stand Transfer   Sit-Stand [place] (Transfers) contact guard   Activities of Daily Living   BADL Assessment/Intervention upper body dressing;lower body dressing   Upper Body Dressing Assessment/Training   [place] Level (Upper Body Dressing) maximum assist (25% patient effort);unable to perform   Lower Body Dressing Assessment/Training   [place] Level (Lower Body Dressing) minimum assist (75% patient effort)   Clinical Impression   Criteria for Skilled Therapeutic Interventions Met (OT) yes   OT Diagnosis impaired self-cares/mobility   OT Problem List-Impairments impacting ADL pain;post-surgical precautions   Assessment of Occupational Performance 1-3 Performance Deficits   Identified Performance Deficits dressing, bathing, toileting   Planned Therapy Interventions (OT) ADL retraining   Clinical Decision Making Complexity (OT) low complexity   Therapy Frequency (OT) 2x/day   Predicted Duration of Therapy 1-2 days   Risks and Benefits of Treatment have been explained. Yes   Patient, Family & other staff in agreement with plan of care Yes   OT Discharge Planning    OT Discharge Recommendation (DC Rec) home   OT Rationale for DC Rec Patient limited by pain in R shoulder; unable to manage donning/doffing sling or UB dressing without intense pain, hyperventilating- see care plan note.   OT Brief overview of current status  Patient alert and oriented, SBA for LB dressing, Max A for UB dressing/sling management.   Total Evaluation Time (Minutes)   Total Evaluation Time (Minutes) 8

## 2021-01-20 NOTE — ANESTHESIA PROCEDURE NOTES
Pre-Procedure   Staff -   Anesthesiologist:  Ganesh Collazo DO  Performed By: anesthesiologist  Location: pre-op  Procedure Start/Stop Times: 1/20/2021 2:40 PM and 1/20/2021 2:49 PM  Pre-Anesthestic Checklist: patient identified, IV checked, site marked, risks and benefits discussed, informed consent, monitors and equipment checked, pre-op evaluation, at physician/surgeon's request and post-op pain management  Timeout:  Correct Patient: Yes   Correct Procedure: Yes   Correct Site: Yes   Correct Position: Yes   Correct Laterality: Yes   Site Marked: Yes    Procedure Documentation  Procedure: Interscalene  Diagnosis: POST OP PAIN  Laterality: right  Patient Position:supine  Patient Prep/Sterile Barriers: sterile gloves, mask, Chloraprep  Local skin infiltrated with 2 mL of 1% lidocaine.   Needle type: short bevel  Needle Gauge: 21.   Needle Length (millimeters) 100   Ultrasound guided, Ultrasound used to identify targeted nerve, plexus, vascular marker, or fascial plane and place a needle adjacent to it in real-time, Ultrasound was used to visualize the spread of anesthetic in close proximity to the above referenced structure  A permanent image is entered into the patient's record.  The nerve(s) appeared anatomically normal, There were no apparent abnormal pathologic findings    Assessment/Narrative      The placement was negative for: blood aspirated, painful injection and site bleeding  Paresthesias: No.  Bolus given via needle..   Secured via.   Insertion/Infusion Method: Single Shot  Complications: none  Injection made incrementally with aspirations every 5 mL.  Comments:  Informed consent obtained.  All risks and benefits of the nerve block discussed with the patient.  All questions answered and all parties agreed with the plan.   Block was placed at the surgeon's request for post operative pain control.

## 2021-01-20 NOTE — PHARMACY-ADMISSION MEDICATION HISTORY
Admission medication history interview status for the 1/19/2021 admission is complete. See Epic admission navigator for allergy information, pharmacy, prior to admission medications and immunization status.     Medication history interview sources:  patient via phone     Changes made to PTA medication list (reason)  Added: none  Deleted: none  Changed: none    Additional medication history information (including reliability of information, actions taken by pharmacist):Dolores denies taking any medications at home.       Prior to Admission medications                 Medication history completed by: Manjula Gordon PharmD, BCPS January 20, 2021

## 2021-01-20 NOTE — UTILIZATION REVIEW
Concurrent stay review; Secondary Review Determination     Under the authority of the Utilization Management Committee, the utilization review process indicated a secondary review on the above patient.  The review outcome is based on review of the medical records, discussions with staff, and applying clinical experience noted on the date of the review.          (x) Outpatient Status Appropriate - Concurrent stay review    RATIONALE FOR DETERMINATION   26 yo man with instability of right shoulder joint underwent right shoulder arthroscopy on 1/19/21 as an outpatient procedure. He was kept for extended observation due to pain complaints and hyperventilation with O2 sats 100% on room air. Still anticipated that he will discharge home today (1/20/2021) with oral pain regimen and outpatient PT/OT.     Patient is clinically improving and there is no clear indication to change patient's status to inpatient. The severity of illness, intensity of service provided, expected LOS and risk for adverse outcome make the care appropriate for observation.    This document was produced using voice recognition software     The information on this document is developed by the utilization review team in order for the business office to ensure compliance.  This only denotes the appropriateness of proper admission status and does not reflect the quality of care rendered.         The definitions of Inpatient Status and Observation Status used in making the determination above are those provided in the CMS Coverage Manual, Chapter 1 and Chapter 6, section 70.4.      Sincerely,   Fabienne Spann MD  Utilization Review  Physician Advisor  [facility].

## 2021-01-21 ENCOUNTER — APPOINTMENT (OUTPATIENT)
Dept: CT IMAGING | Facility: CLINIC | Age: 26
End: 2021-01-21
Attending: INTERNAL MEDICINE
Payer: COMMERCIAL

## 2021-01-21 ENCOUNTER — APPOINTMENT (OUTPATIENT)
Dept: OCCUPATIONAL THERAPY | Facility: CLINIC | Age: 26
End: 2021-01-21
Attending: ORTHOPAEDIC SURGERY
Payer: COMMERCIAL

## 2021-01-21 LAB
ALBUMIN SERPL-MCNC: 3.6 G/DL (ref 3.4–5)
ALP SERPL-CCNC: 66 U/L (ref 40–150)
ALT SERPL W P-5'-P-CCNC: 20 U/L (ref 0–70)
ANION GAP SERPL CALCULATED.3IONS-SCNC: 5 MMOL/L (ref 3–14)
AST SERPL W P-5'-P-CCNC: 24 U/L (ref 0–45)
BASE EXCESS BLDV CALC-SCNC: 1.8 MMOL/L
BASOPHILS # BLD AUTO: 0 10E9/L (ref 0–0.2)
BASOPHILS NFR BLD AUTO: 0.2 %
BILIRUB SERPL-MCNC: 0.5 MG/DL (ref 0.2–1.3)
BUN SERPL-MCNC: 14 MG/DL (ref 7–30)
CALCIUM SERPL-MCNC: 8.5 MG/DL (ref 8.5–10.1)
CHLORIDE SERPL-SCNC: 107 MMOL/L (ref 94–109)
CO2 SERPL-SCNC: 28 MMOL/L (ref 20–32)
CREAT SERPL-MCNC: 0.94 MG/DL (ref 0.66–1.25)
DIFFERENTIAL METHOD BLD: ABNORMAL
EOSINOPHIL # BLD AUTO: 0 10E9/L (ref 0–0.7)
EOSINOPHIL NFR BLD AUTO: 0.2 %
ERYTHROCYTE [DISTWIDTH] IN BLOOD BY AUTOMATED COUNT: 12.6 % (ref 10–15)
GFR SERPL CREATININE-BSD FRML MDRD: >90 ML/MIN/{1.73_M2}
GLUCOSE BLDC GLUCOMTR-MCNC: 96 MG/DL (ref 70–99)
GLUCOSE SERPL-MCNC: 107 MG/DL (ref 70–99)
HCO3 BLDV-SCNC: 28 MMOL/L (ref 21–28)
HCT VFR BLD AUTO: 44.2 % (ref 40–53)
HGB BLD-MCNC: 14.7 G/DL (ref 13.3–17.7)
IMM GRANULOCYTES # BLD: 0.1 10E9/L (ref 0–0.4)
IMM GRANULOCYTES NFR BLD: 0.4 %
LYMPHOCYTES # BLD AUTO: 1.1 10E9/L (ref 0.8–5.3)
LYMPHOCYTES NFR BLD AUTO: 9.5 %
MCH RBC QN AUTO: 29.6 PG (ref 26.5–33)
MCHC RBC AUTO-ENTMCNC: 33.3 G/DL (ref 31.5–36.5)
MCV RBC AUTO: 89 FL (ref 78–100)
MONOCYTES # BLD AUTO: 1.2 10E9/L (ref 0–1.3)
MONOCYTES NFR BLD AUTO: 10.7 %
NEUTROPHILS # BLD AUTO: 9 10E9/L (ref 1.6–8.3)
NEUTROPHILS NFR BLD AUTO: 79 %
NRBC # BLD AUTO: 0 10*3/UL
NRBC BLD AUTO-RTO: 0 /100
O2/TOTAL GAS SETTING VFR VENT: 21 %
PCO2 BLDV: 47 MM HG (ref 40–50)
PH BLDV: 7.38 PH (ref 7.32–7.43)
PLATELET # BLD AUTO: 200 10E9/L (ref 150–450)
PO2 BLDV: 46 MM HG (ref 25–47)
POTASSIUM SERPL-SCNC: 4.1 MMOL/L (ref 3.4–5.3)
PROT SERPL-MCNC: 6.6 G/DL (ref 6.8–8.8)
RBC # BLD AUTO: 4.97 10E12/L (ref 4.4–5.9)
SODIUM SERPL-SCNC: 140 MMOL/L (ref 133–144)
TROPONIN I SERPL-MCNC: <0.015 UG/L (ref 0–0.04)
WBC # BLD AUTO: 11.5 10E9/L (ref 4–11)

## 2021-01-21 PROCEDURE — 999N000104 HC STATISTIC NO CHARGE: Performed by: EMERGENCY MEDICINE

## 2021-01-21 PROCEDURE — 99207 PR APP CREDIT; MD BILLING SHARED VISIT: CPT | Performed by: INTERNAL MEDICINE

## 2021-01-21 PROCEDURE — 250N000011 HC RX IP 250 OP 636: Performed by: ORTHOPAEDIC SURGERY

## 2021-01-21 PROCEDURE — 80053 COMPREHEN METABOLIC PANEL: CPT | Performed by: STUDENT IN AN ORGANIZED HEALTH CARE EDUCATION/TRAINING PROGRAM

## 2021-01-21 PROCEDURE — 250N000011 HC RX IP 250 OP 636: Performed by: STUDENT IN AN ORGANIZED HEALTH CARE EDUCATION/TRAINING PROGRAM

## 2021-01-21 PROCEDURE — 84484 ASSAY OF TROPONIN QUANT: CPT | Performed by: STUDENT IN AN ORGANIZED HEALTH CARE EDUCATION/TRAINING PROGRAM

## 2021-01-21 PROCEDURE — 250N000013 HC RX MED GY IP 250 OP 250 PS 637: Performed by: CLINICAL NURSE SPECIALIST

## 2021-01-21 PROCEDURE — 0042T CT HEAD PERFUSION WITH CONTRAST: CPT

## 2021-01-21 PROCEDURE — 93005 ELECTROCARDIOGRAM TRACING: CPT

## 2021-01-21 PROCEDURE — 0042T CT HEAD PERFUSION WITH CONTRAST: CPT | Mod: 26 | Performed by: STUDENT IN AN ORGANIZED HEALTH CARE EDUCATION/TRAINING PROGRAM

## 2021-01-21 PROCEDURE — 36415 COLL VENOUS BLD VENIPUNCTURE: CPT | Performed by: STUDENT IN AN ORGANIZED HEALTH CARE EDUCATION/TRAINING PROGRAM

## 2021-01-21 PROCEDURE — 258N000003 HC RX IP 258 OP 636: Performed by: STUDENT IN AN ORGANIZED HEALTH CARE EDUCATION/TRAINING PROGRAM

## 2021-01-21 PROCEDURE — 70450 CT HEAD/BRAIN W/O DYE: CPT | Mod: 26 | Performed by: STUDENT IN AN ORGANIZED HEALTH CARE EDUCATION/TRAINING PROGRAM

## 2021-01-21 PROCEDURE — 70496 CT ANGIOGRAPHY HEAD: CPT

## 2021-01-21 PROCEDURE — G0378 HOSPITAL OBSERVATION PER HR: HCPCS

## 2021-01-21 PROCEDURE — 250N000013 HC RX MED GY IP 250 OP 250 PS 637: Performed by: STUDENT IN AN ORGANIZED HEALTH CARE EDUCATION/TRAINING PROGRAM

## 2021-01-21 PROCEDURE — 70498 CT ANGIOGRAPHY NECK: CPT | Mod: 26 | Performed by: STUDENT IN AN ORGANIZED HEALTH CARE EDUCATION/TRAINING PROGRAM

## 2021-01-21 PROCEDURE — 250N000009 HC RX 250: Performed by: ORTHOPAEDIC SURGERY

## 2021-01-21 PROCEDURE — 97535 SELF CARE MNGMENT TRAINING: CPT | Mod: GO

## 2021-01-21 PROCEDURE — 70450 CT HEAD/BRAIN W/O DYE: CPT

## 2021-01-21 PROCEDURE — 999N001017 HC STATISTIC GLUCOSE BY METER IP

## 2021-01-21 PROCEDURE — 70496 CT ANGIOGRAPHY HEAD: CPT | Mod: 26 | Performed by: STUDENT IN AN ORGANIZED HEALTH CARE EDUCATION/TRAINING PROGRAM

## 2021-01-21 PROCEDURE — 96374 THER/PROPH/DIAG INJ IV PUSH: CPT | Performed by: EMERGENCY MEDICINE

## 2021-01-21 PROCEDURE — 82803 BLOOD GASES ANY COMBINATION: CPT | Performed by: STUDENT IN AN ORGANIZED HEALTH CARE EDUCATION/TRAINING PROGRAM

## 2021-01-21 PROCEDURE — 85025 COMPLETE CBC W/AUTO DIFF WBC: CPT | Performed by: STUDENT IN AN ORGANIZED HEALTH CARE EDUCATION/TRAINING PROGRAM

## 2021-01-21 PROCEDURE — 250N000013 HC RX MED GY IP 250 OP 250 PS 637: Performed by: INTERNAL MEDICINE

## 2021-01-21 PROCEDURE — 96375 TX/PRO/DX INJ NEW DRUG ADDON: CPT

## 2021-01-21 PROCEDURE — 250N000013 HC RX MED GY IP 250 OP 250 PS 637: Performed by: ORTHOPAEDIC SURGERY

## 2021-01-21 PROCEDURE — 99218 PR INITIAL OBSERVATION CARE,LEVEL I: CPT | Performed by: STUDENT IN AN ORGANIZED HEALTH CARE EDUCATION/TRAINING PROGRAM

## 2021-01-21 PROCEDURE — 99207 PR CDG-CODE CATEGORY CHANGED: CPT | Performed by: STUDENT IN AN ORGANIZED HEALTH CARE EDUCATION/TRAINING PROGRAM

## 2021-01-21 PROCEDURE — 96376 TX/PRO/DX INJ SAME DRUG ADON: CPT

## 2021-01-21 PROCEDURE — 93010 ELECTROCARDIOGRAM REPORT: CPT | Performed by: INTERNAL MEDICINE

## 2021-01-21 PROCEDURE — 97530 THERAPEUTIC ACTIVITIES: CPT | Mod: GO

## 2021-01-21 RX ORDER — KETOROLAC TROMETHAMINE 10 MG/1
10 TABLET, FILM COATED ORAL EVERY 8 HOURS PRN
Status: DISCONTINUED | OUTPATIENT
Start: 2021-01-21 | End: 2021-01-21

## 2021-01-21 RX ORDER — OXYCODONE HYDROCHLORIDE 10 MG/1
10 TABLET ORAL
Status: DISCONTINUED | OUTPATIENT
Start: 2021-01-21 | End: 2021-01-21

## 2021-01-21 RX ORDER — OXYCODONE HYDROCHLORIDE 5 MG/1
5 TABLET ORAL EVERY 4 HOURS PRN
Qty: 28 TABLET | Refills: 0 | Status: SHIPPED | OUTPATIENT
Start: 2021-01-21 | End: 2021-02-03

## 2021-01-21 RX ORDER — DIPHENHYDRAMINE HCL 50 MG
50 CAPSULE ORAL EVERY 6 HOURS PRN
Status: DISCONTINUED | OUTPATIENT
Start: 2021-01-21 | End: 2021-01-22 | Stop reason: HOSPADM

## 2021-01-21 RX ORDER — IBUPROFEN 600 MG/1
600 TABLET, FILM COATED ORAL EVERY 6 HOURS PRN
Status: DISCONTINUED | OUTPATIENT
Start: 2021-01-21 | End: 2021-01-22 | Stop reason: HOSPADM

## 2021-01-21 RX ORDER — ALBUTEROL SULFATE 90 UG/1
2 AEROSOL, METERED RESPIRATORY (INHALATION) EVERY 6 HOURS PRN
Status: DISCONTINUED | OUTPATIENT
Start: 2021-01-21 | End: 2021-01-22 | Stop reason: HOSPADM

## 2021-01-21 RX ORDER — OXYCODONE HYDROCHLORIDE 5 MG/1
5 TABLET ORAL EVERY 4 HOURS PRN
Status: DISCONTINUED | OUTPATIENT
Start: 2021-01-21 | End: 2021-01-22 | Stop reason: HOSPADM

## 2021-01-21 RX ORDER — IOPAMIDOL 755 MG/ML
100 INJECTION, SOLUTION INTRAVASCULAR ONCE
Status: COMPLETED | OUTPATIENT
Start: 2021-01-21 | End: 2021-01-21

## 2021-01-21 RX ORDER — OXYCODONE HYDROCHLORIDE 5 MG/1
5 TABLET ORAL
Status: DISCONTINUED | OUTPATIENT
Start: 2021-01-21 | End: 2021-01-21

## 2021-01-21 RX ORDER — IOPAMIDOL 755 MG/ML
50 INJECTION, SOLUTION INTRAVASCULAR ONCE
Status: COMPLETED | OUTPATIENT
Start: 2021-01-21 | End: 2021-01-21

## 2021-01-21 RX ORDER — KETOROLAC TROMETHAMINE 10 MG/1
10 TABLET, FILM COATED ORAL EVERY 6 HOURS PRN
Status: DISCONTINUED | OUTPATIENT
Start: 2021-01-21 | End: 2021-01-21

## 2021-01-21 RX ADMIN — IOPAMIDOL 40 ML: 755 INJECTION, SOLUTION INTRAVENOUS at 13:17

## 2021-01-21 RX ADMIN — BENZOCAINE AND MENTHOL 1 LOZENGE: 15; 3.6 LOZENGE ORAL at 05:21

## 2021-01-21 RX ADMIN — OXYCODONE HYDROCHLORIDE 10 MG: 10 TABLET ORAL at 00:05

## 2021-01-21 RX ADMIN — SODIUM CHLORIDE, POTASSIUM CHLORIDE, SODIUM LACTATE AND CALCIUM CHLORIDE 500 ML: 600; 310; 30; 20 INJECTION, SOLUTION INTRAVENOUS at 07:10

## 2021-01-21 RX ADMIN — ONDANSETRON 4 MG: 2 INJECTION INTRAMUSCULAR; INTRAVENOUS at 05:51

## 2021-01-21 RX ADMIN — IOPAMIDOL 75 ML: 755 INJECTION, SOLUTION INTRAVENOUS at 13:01

## 2021-01-21 RX ADMIN — IBUPROFEN 600 MG: 600 TABLET, FILM COATED ORAL at 19:52

## 2021-01-21 RX ADMIN — OXYCODONE HYDROCHLORIDE 5 MG: 5 TABLET ORAL at 12:06

## 2021-01-21 RX ADMIN — DOCUSATE SODIUM AND SENNOSIDES 1 TABLET: 8.6; 5 TABLET ORAL at 08:19

## 2021-01-21 RX ADMIN — ONDANSETRON 4 MG: 2 INJECTION INTRAMUSCULAR; INTRAVENOUS at 14:26

## 2021-01-21 RX ADMIN — POLYETHYLENE GLYCOL 3350 17 G: 17 POWDER, FOR SOLUTION ORAL at 08:19

## 2021-01-21 RX ADMIN — GABAPENTIN 300 MG: 300 CAPSULE ORAL at 19:52

## 2021-01-21 RX ADMIN — DOCUSATE SODIUM 100 MG: 100 CAPSULE, LIQUID FILLED ORAL at 08:19

## 2021-01-21 RX ADMIN — KETOROLAC TROMETHAMINE 15 MG: 15 INJECTION, SOLUTION INTRAMUSCULAR; INTRAVENOUS at 02:50

## 2021-01-21 RX ADMIN — METHOCARBAMOL 750 MG: 750 TABLET ORAL at 05:16

## 2021-01-21 RX ADMIN — SODIUM CHLORIDE 80 ML: 9 INJECTION, SOLUTION INTRAVENOUS at 13:02

## 2021-01-21 RX ADMIN — ACETAMINOPHEN 650 MG: 325 TABLET ORAL at 21:55

## 2021-01-21 RX ADMIN — ACETAMINOPHEN 975 MG: 325 TABLET ORAL at 12:06

## 2021-01-21 RX ADMIN — LIDOCAINE 2 PATCH: 560 PATCH PERCUTANEOUS; TOPICAL; TRANSDERMAL at 20:00

## 2021-01-21 RX ADMIN — GABAPENTIN 300 MG: 300 CAPSULE ORAL at 08:19

## 2021-01-21 RX ADMIN — ACETAMINOPHEN 975 MG: 325 TABLET ORAL at 05:16

## 2021-01-21 RX ADMIN — GABAPENTIN 300 MG: 300 CAPSULE ORAL at 14:52

## 2021-01-21 RX ADMIN — OXYCODONE HYDROCHLORIDE 10 MG: 10 TABLET ORAL at 02:51

## 2021-01-21 NOTE — CONSULTS
[facility]   Consult Note - Hospitalist Service     Date of Admission:  1/19/2021  Consult Requested by: Dr. Patel  Reason for Consult: Syncopal episode    Assessment & Plan     25 year old man with history of right shoulder instability and history of testicular torsion who recently underwent Latarjet and grafting of R humeral head on 1/19/21. Requested by orthopedics service to see patient related to concern by nursing for a presyncopal episode.     Presyncopal Episode:   Differential for episode described is broad, including cardiac, neurologic, medication-induced, or vasovagal. Doubt related to hypovolemia given normotensive/normal heart rates surrounding event. Neurologic examination notable for an decreased sensation of right side of face, head and neck, though patient's participation in exam is inconsistent and it is unclear to me whether anesthetics used for blocks in pain control may be related. Most likely this is related to increases in pain medications, but will send further workup.  -Check EKG  -Check CMP, CBC, VBG, troponin  -Would recommend repeat neurologic evaluation when patient more awake and participatory, little suspicion on history or examination for acute stroke event.      Thank you for this consult. We will continue to follow. The patient's care was discussed with the Bedside Nurse.    Raymundo Monroy MD  [facility]     ______________________________________________________________________    Chief Complaint    Syncopal episode    History of Present Illness   25 year old man with history of right shoulder instability and history of testicular torsion who recently underwent Latarjet and grafting of R humeral head on 1/19/21. Requested by orthopedics service to see patient related to concern by nursing for a syncopal episode.    According to nursing, patient had an episode of feeling dizzy while  standing and walking with nursing aid this morning. Drank some water and continued to ambulate with assistance, but became dizzy again and nauseated. Was able to sit into a wheelchair and transfer to bed. No fall, no head strike. Had recently had his pain medication increased per nursing notes.     Mr. Robles is answering questions appropriately, though obtaining historical information is challenging at this time. He declines opening his eyes and notes that he feels numbness/tingling in both arms, numbness of right side of head, pain in his neck.    Vital sign review with stable and reassuring vitals    Review of Systems   The 10 point Review of Systems is negative other than noted in the HPI or here.     Past Medical History    I have reviewed this patient's medical history and updated it with pertinent information if needed.   Past Medical History:   Diagnosis Date     Instability of right shoulder joint      Testicular torsion        Past Surgical History   I have reviewed this patient's surgical history and updated it with pertinent information if needed.  History reviewed. No pertinent surgical history.    Social History   I have reviewed this patient's social history and updated it with pertinent information if needed.  Social History     Tobacco Use     Smoking status: Never Smoker     Smokeless tobacco: Never Used   Substance Use Topics     Alcohol use: Yes     Frequency: Monthly or less     Drug use: None       Family History   I have reviewed this patient's family history and updated it with pertinent information if needed.   Family History   Problem Relation Age of Onset     Anesthesia Reaction No family hx of      Cardiovascular No family hx of      Deep Vein Thrombosis (DVT) No family hx of        Medications   I have reviewed this patient's current medications    Allergies   No Known Allergies    Physical Exam   Vital Signs: Temp: 97.4  F (36.3  C) Temp src: Oral BP: 139/86 Pulse: 70   Resp: 16 SpO2:  100 % O2 Device: None (Room air)    Weight: 195 lbs 1.71 oz    General Appearance: NAD, appropriately responds to questions, lying in hospital bed  Eyes: Refuses to open eyes  HEENT: MMM, NC/AT  Respiratory: Breathing comfortably in room air  NEurologic: CNII-XII assessment notable for decreased sensation over forehead (V1 distribution) though is not highly participatory in exam otherwise.  Cardiovascular: RRR no MRG  GI: Abd soft, NTND      EKG with NSR @ 66bpm, reassuring intervals, LVH, otherwise no concerning morphology.    Data   I personally reviewed no images or EKG's today.  Results for orders placed or performed during the hospital encounter of 01/19/21 (from the past 24 hour(s))   Pain Management Adult IP Consult: Patient to be seen: Routine - within 24 hours; MANAGE PAIN; Consultant may enter orders: Yes; Requesting provider? Other supervising provider; Name: Samantha Trimble PA-C     1/20/2021  1:11 PM  Inpatient Pain Management Service Consultation Note  01/20/21        ADMIT DATE: 1/19/2021 1 Day Post-Op   DATE OF CONSULT: January 20, 2021  Consult ordered by: Maile Vasquez CNP  Consult performed by:  Samantha Lowery PA-C      REASON FOR PAIN CONSULTATION:  Dolores Robles is a 25 year old   male I am seeing in consultation for evaluation and   recommendations regarding his acute post operative right shoulder   pain s/p right shoulder arthroscopy on 1/19/21 due to instability   with multiple dislocations.    Visit/Communication Style  VIRTUAL (VIDEO) communication was used to evaluate Dolores due to   the COVID pandemic.    Dolores consented to the use of video communication: yes  Video START time: 1205, 1/20/2021  Video STOP time: 1229, 1/20/2021   Patient's location: [facility]    Provider's location during the visit: [facility]        CHIEF PAIN COMPLAINT: right anterior  "shoulder pain    ASSESSMENT:   1. Acute post operative right shoulder pain s/p right shoulder   arthroscopy on 1/19/21 due to instability with multiple   dislocations.  2. Intolerance to swallowing bigger pills-states he does not like   to swallow big pills and tends to crush them to be able to take   them.      -- Outpatient opioid requirements prior to admission: none   chronically   reviewed.     10/18/20 hydrocodone-aceta 5/325mg  #8 for 1 day  9/18/20 oxy-aceta 5/325mg #10 for 3 days    Primary Care Provider: No Ref-Primary, Physician  Chronic Pain Provider: none at this time.    TREATMENT RECOMMENDATIONS/PLAN:   1. Continue with oxycodone 5-10mg PO Q 4 hours PRN.  Discussed   that as pain improves, start tapering down by 1 dose (10mg)/day   PRN x 1-2 days until discontinuation.  Discussed that this is   meant for short term use 5-7 days post operatively.     Indications,risks, and benefits of opioids were discussed.  2. Would recommend discontinue any IV opioids. Discussed this   with patient that this is to prepare him for home pain management   as IV opioid is not an option.  3. Agree with gabapentin 300mg PO TID (new to him-declined   earlier as it was \"too big for him to swallow.\"  Liquid   formulation available.  4. Continue with Robaxin 750mg PO Q 6 hours PRN.  5. Start lidocaine patch 4% 1-3 patches Q 24 hours, 12 hours on   and 12 hours off.  6. Start menthol patch 5% 1 patch TD Q 8 hours.  7. Continue acetaminophen 975mg PO Q 8 hours.  8. Bowel regimen to prevent opioid induced constipation.  9. Continue Physical therapy as tolerated      Pain Service will Sign Off at this time.     Recommendations were discussed with ortho team  Plan was reviewed by the Pain Service staff anesthesiologist Dr. Velarde    Thank you for consulting the Inpatient Pain Management Service.     The above recommendations are to be acted upon at the primary   team s discretion.    To reach us:  Mon - Friday 8 AM - 3 PM: " Pager [phone] (Text Page)  After hours, weekends and holidays: Primary service should call   [phone] the answering service for the on-call pain   specialist    HISTORY OF PRESENT ILLNESS: Dolores Robles is a 25 year old male   with history of right shoulder instability and dislocations who   was admitted on 1/19/21 for right shoulder arthroscopy.  Pain   service was consulted to assist with pain management.    Dolores was evaluated via video due to pandemic. RN was present   during the whole visit.   Dolores states pain is in the anterior   right shoulder at site of incision and is sharp. Rates pain   7-8/10 as he just worked with physical therapy.  States any type   of movement to that right UE is very painful.  He would like to   achieve pain level of less than 5/10.  Baseline pain level PTA,   was 3/10 but states that he was not using the right shoulder or   arm because he was trying to prevent any more dislocations.  He   states that oxycodone 5-10mg works but does not last very long   15-30 minutes.    I reviewed the pain plan as above and discussed the pharmacology   of the pain medications.  Advise him to take the oxycodone 10mg   every 4 hours as they are available and use adjuvants to assist   with pain relief.  He was declining adjuvants earlier in the day,   some due to size of pills.  Dolores is agreeable to the plan.    PAIN HISTORY:   Onset: after surgery.  Location: right shoulder  Duration: ongoing  Pain intensity: 7-8/10  Quality characteristics: sharp, knifelike  Aggravating factors: movements  Relieving factors: pain medication, rest.      FUNCTIONAL STATUS:  Change:      unchanged  Oral intake:     Regular  Bowel function:    Normal  Activity level:     Participated in PT, moving arms  Sleep:      Poor sleep due to pain, poor sleep PTA-due to RUE   pain with repositioning  Mood:      Pleasant, cooperative    REVIEW OF 10 BODY SYSTEMS: 10 point ROS of systems including   Constitutional,  Eyes, Respiratory, Cardiovascular,   Gastroenterology, Genitourinary, Integumentary, Musculoskeletal,   Psychiatric were all negative except for pertinent positives   noted in my HPI.     INPATIENT MEDICATIONS PERTINENT TO PAIN CONSULT:   Medications related to Pain Management (From now, onward)    Start     Dose/Rate Route Frequency Ordered Stop    01/22/21 0000  acetaminophen (TYLENOL) tablet 650 mg      650 mg Oral EVERY 4 HOURS PRN 01/19/21 2005 01/20/21 0800  polyethylene glycol (MIRALAX) Packet 17 g      17 g Oral DAILY 01/19/21 2146 01/20/21 0000  acetaminophen (TYLENOL) 325 MG tablet      650 mg Oral EVERY 4 HOURS PRN 01/20/21 0928      01/20/21 0000  ibuprofen (ADVIL/MOTRIN) 600 MG tablet      600 mg Oral EVERY 6 HOURS PRN 01/20/21 0928 01/20/21 0000  oxyCODONE (ROXICODONE) 5 MG tablet      5-10 mg Oral EVERY 4 HOURS PRN 01/20/21 0928      01/20/21 0000  polyethylene glycol (MIRALAX) 17 g packet      17 g Oral DAILY 01/20/21 0928 01/20/21 0000  senna-docusate (SENOKOT-S/PERICOLACE) 8.6-50 MG   tablet      1 tablet Oral 2 TIMES DAILY 01/20/21 0928 01/19/21 2329  ketamine (KETALAR) injection 10 mg      10 mg  over 2-5 Minutes Intravenous EVERY 2 HOURS PRN 01/19/21 2329 01/19/21 2253  ketorolac (TORADOL) injection 15 mg      15 mg Intravenous EVERY 6 HOURS PRN 01/19/21 2253 01/24/21 2252 01/19/21 2252  HYDROmorphone (PF) (DILAUDID) injection 0.3-0.5   mg      0.3-0.5 mg Intravenous EVERY 2 HOURS PRN 01/19/21 2253 01/19/21 2200  senna-docusate (SENOKOT-S/PERICOLACE) 8.6-50 MG   per tablet 1 tablet      1 tablet Oral 2 TIMES DAILY 01/19/21 2146 01/19/21 2200  docusate sodium (COLACE) capsule 100 mg      100 mg Oral 2 TIMES DAILY 01/19/21 2146 01/19/21 2200  gabapentin (NEURONTIN) capsule 300 mg      300 mg Oral 3 TIMES DAILY 01/19/21 2005 01/19/21 2146  ibuprofen (ADVIL/MOTRIN) tablet 600 mg      600 mg Oral ONCE PRN 01/19/21 2146 01/19/21 2146  oxyCODONE  (ROXICODONE) tablet 5 mg      5 mg Oral ONCE PRN 01/19/21 2146 01/19/21 2146  lidocaine 1 % 0.1-1 mL      0.1-1 mL Other EVERY 1 HOUR PRN 01/19/21 2146 01/19/21 2146  lidocaine (LMX4) cream       Topical EVERY 1 HOUR PRN 01/19/21 2146 01/19/21 2146  magnesium hydroxide (MILK OF MAGNESIA) suspension   30 mL      30 mL Oral DAILY PRN 01/19/21 2146 01/19/21 2146  bisacodyl (DULCOLAX) Suppository 10 mg      10 mg Rectal DAILY PRN 01/19/21 2146 01/19/21 2146  sodium phosphate (FLEET ENEMA) 1 enema      1 enema Rectal ONCE PRN 01/19/21 2146 01/19/21 2030  acetaminophen (TYLENOL) tablet 975 mg      975 mg Oral EVERY 8 HOURS 01/19/21 2005 01/22/21 2059 01/19/21 2005  oxyCODONE (ROXICODONE) tablet 5 mg      5 mg Oral EVERY 3 HOURS PRN 01/19/21 2005 01/19/21 2005  oxyCODONE IR (ROXICODONE) tablet 10 mg      10 mg Oral EVERY 4 HOURS PRN 01/19/21 2005 01/19/21 2005  methocarbamol (ROBAXIN) tablet 750 mg      750 mg Oral EVERY 6 HOURS PRN 01/19/21 2005            CURRENT MEDICATIONS:   Current Facility-Administered Medications Ordered in Epic   Medication Dose Route Frequency Provider Last Rate Last Admin     [START ON 1/22/2021] acetaminophen (TYLENOL) tablet 650 mg  650   mg Oral Q4H PRN Екатерина Patel MD         acetaminophen (TYLENOL) tablet 975 mg  975 mg Oral Q8H   Екатерина Patel MD   975 mg at 01/20/21 0541     benzocaine-menthol (CEPACOL) 15-3.6 MG lozenge 1 lozenge  1   lozenge Buccal Q1H PRN Екатерина Patel MD         bisacodyl (DULCOLAX) Suppository 10 mg  10 mg Rectal Daily PRN   Екатерина Patel MD         docusate sodium (COLACE) capsule 100 mg  100 mg Oral BID   Екатерина Patel MD         gabapentin (NEURONTIN) capsule 300 mg  300 mg Oral TID   Екатерина Patel MD   300 mg at 01/19/21 4760     HYDROmorphone (PF) (DILAUDID) injection 0.3-0.5 mg  0.3-0.5 mg   Intravenous Q2H PRN Steve Duncan MD   0.5 mg at 01/20/21    0027     ibuprofen (ADVIL/MOTRIN) tablet 600 mg  600 mg Oral Once PRN   Екатерина Patel MD         ketamine (KETALAR) injection 10 mg  10 mg Intravenous Q2H PRN   Екатерина Patel MD         ketorolac (TORADOL) injection 15 mg  15 mg Intravenous Q6H PRN   Steve Duncan MD   15 mg at 01/20/21 0731     lactated ringers infusion   Intravenous Continuous Екатерина Patel  mL/hr at 01/20/21 0034 Rate Verify at   01/20/21 0034     lidocaine (LMX4) cream   Topical Q1H PRN Екатерина Patel MD         lidocaine 1 % 0.1-1 mL  0.1-1 mL Other Q1H PRN Екатерина Patel MD         magnesium hydroxide (MILK OF MAGNESIA) suspension 30 mL  30 mL   Oral Daily PRN Екатерина Patel MD         methocarbamol (ROBAXIN) tablet 750 mg  750 mg Oral Q6H PRN   Екатерина Patel MD         naloxone (NARCAN) injection 0.2 mg  0.2 mg Intravenous Q2 Min   PRN Екатерина Patel MD        Or     naloxone (NARCAN) injection 0.4 mg  0.4 mg Intravenous Q2 Min   PRN Екатерина Patel MD        Or     naloxone (NARCAN) injection 0.2 mg  0.2 mg Intramuscular Q2 Min   PRN Екатерина Patel MD        Or     naloxone (NARCAN) injection 0.4 mg  0.4 mg Intramuscular Q2 Min   PRN Екатерина Patel MD         ondansetron (ZOFRAN-ODT) ODT tab 4 mg  4 mg Oral Q6H PRN   Екатерина Patel MD        Or     ondansetron (ZOFRAN) injection 4 mg  4 mg Intravenous Q6H PRN   Екатерина Patel MD   4 mg at 01/20/21 1022     ondansetron (ZOFRAN-ODT) ODT tab 4 mg  4 mg Oral Once PRN   Екатерина Patel MD         oxyCODONE (ROXICODONE) tablet 5 mg  5 mg Oral Once PRN   Екатерина Patel MD         oxyCODONE (ROXICODONE) tablet 5 mg  5 mg Oral Q3H PRN Екатерина Patel MD   5 mg at 01/19/21 2210    Or     oxyCODONE IR (ROXICODONE) tablet 10 mg  10 mg Oral Q4H PRN   Екатерина Patel MD   10 mg at 01/20/21 0936     polyethylene glycol (MIRALAX) Packet  17 g  17 g Oral Daily   Екатерина Patel MD         prochlorperazine (COMPAZINE) injection 10 mg  10 mg Intravenous   Q6H PRN Екатерина Patel MD        Or     prochlorperazine (COMPAZINE) tablet 10 mg  10 mg Oral Q6H PRN   Екатерина Patel MD         senna-docusate (SENOKOT-S/PERICOLACE) 8.6-50 MG per tablet 1   tablet  1 tablet Oral BID Екатерина Patel MD   1 tablet   at 01/20/21 0033     sodium chloride (PF) 0.9% PF flush 3 mL  3 mL Intracatheter Q8H   Екатерина Patel MD         sodium chloride (PF) 0.9% PF flush 3 mL  3 mL Intracatheter q1   min prn Екатерина Patel MD         sodium phosphate (FLEET ENEMA) 1 enema  1 enema Rectal Once PRN   Екатерина Patel MD         Current Outpatient Medications Ordered in Epic   Medication Sig Dispense Refill     acetaminophen (TYLENOL) 325 MG tablet Take 2 tablets (650 mg)   by mouth every 4 hours as needed for mild pain 50 tablet 0     ibuprofen (ADVIL/MOTRIN) 600 MG tablet Take 1 tablet (600 mg)   by mouth every 6 hours as needed for other (mild and/or   inflammatory pain) 30 tablet 0     oxyCODONE (ROXICODONE) 5 MG tablet Take 1-2 tablets (5-10 mg)   by mouth every 4 hours as needed for pain 28 tablet 0     polyethylene glycol (MIRALAX) 17 g packet Take 17 g by mouth   daily 7 packet 0     senna-docusate (SENOKOT-S/PERICOLACE) 8.6-50 MG tablet Take 1   tablet by mouth 2 times daily 30 tablet 0       HOME/PREVIOUS MEDICATIONS:   Prior to Admission medications    Medication Sig Start Date End Date Taking? Authorizing Provider   acetaminophen (TYLENOL) 325 MG tablet Take 2 tablets (650 mg) by   mouth every 4 hours as needed for mild pain 1/20/21  Yes Maile Vasquez APRN CNS   ibuprofen (ADVIL/MOTRIN) 600 MG tablet Take 1 tablet (600 mg) by   mouth every 6 hours as needed for other (mild and/or inflammatory   pain) 1/20/21  Yes Bebler, Maile A, APRN CNS   oxyCODONE (ROXICODONE) 5 MG tablet Take 1-2 tablets (5-10  mg) by   mouth every 4 hours as needed for pain 1/20/21  Yes Maile Vasquez APRN CNS   polyethylene glycol (MIRALAX) 17 g packet Take 17 g by mouth   daily 1/20/21  Yes Maile Vasquez APRN CNS   senna-docusate (SENOKOT-S/PERICOLACE) 8.6-50 MG tablet Take 1   tablet by mouth 2 times daily 1/20/21  Yes Maile Vasquez APRN CNS       ALLERGIES:  No Known Allergies     PAST MEDICAL AND PSYCHIATRIC HISTORY:    Past Medical History:   Diagnosis Date     Instability of right shoulder joint      Testicular torsion        PAST SURGICAL HISTORY: History reviewed. No pertinent surgical   history.    FAMILY HISTORY: family history is not on file.    HEALTH & LIFESTYLE PRACTICES:   Tobacco:  reports that he has never smoked. He has never used   smokeless tobacco.  Alcohol:  reports current alcohol use.  Illicit drugs:  has no history on file for drug.    SOCIAL HISTORY NARRATIVE:   Social History     Socioeconomic History     Marital status: Single     Spouse name: Not on file     Number of children: Not on file     Years of education: Not on file     Highest education level: Not on file   Occupational History     Not on file   Social Needs     Financial resource strain: Not on file     Food insecurity     Worry: Not on file     Inability: Not on file     Transportation needs     Medical: Not on file     Non-medical: Not on file   Tobacco Use     Smoking status: Never Smoker     Smokeless tobacco: Never Used   Substance and Sexual Activity     Alcohol use: Yes     Frequency: Monthly or less     Drug use: Not on file     Sexual activity: Not on file   Lifestyle     Physical activity     Days per week: Not on file     Minutes per session: Not on file     Stress: Not on file   Relationships     Social connections     Talks on phone: Not on file     Gets together: Not on file     Attends [employer] service: Not on file     Active member of club or organization: Not on file     Attends meetings of clubs or  "organizations: Not on file     Relationship status: Not on file     Intimate partner violence     Fear of current or ex partner: Not on file     Emotionally abused: Not on file     Physically abused: Not on file     Forced sexual activity: Not on file   Other Topics Concern     Not on file   Social History Narrative     Not on file         LABORATORY VALUES:     CBC results:  Lab Results   Component Value Date    HGB 17.1 01/20/2021       DIAGNOSTIC TESTS:     Labs above reviewed as well as additional relevant diagnostic   studies from the EPIC record.     PHYSICAL EXAMINATION:  BP (!) (P) 173/90   Pulse (P) 97   Temp 97.7  F (36.5  C)     Resp 16   Ht 1.803 m (5' 11\")   Wt 88.5 kg (195 lb 1.7 oz)     SpO2 (P) 100%   BMI 27.21 kg/m       EXAM:limited due to video visit.    CONSTITUTIONAL/GENERAL APPEARANCE: AAOx3. Conversant.  EYES: EOMI, sclerae clear  RESPIRATORY:breathing on room air  CARDIOVASCULAR: HR within normal limits  MUSCULOSKELETAL/BACK/SPINE/EXTREMITIES: anterior shoulder-small   bandage-c,d,i.     NEURO: AAOx3  SKIN/VASCULAR EXAM:  Dry and warm.  PSYCHIATRIC/BEHAVIORAL/OBSERVATIONS:  No objective signs of pain   observed during our interview.   Judgment/Insight -fair   Orientation - x3   Memory -good   Mood and affect - calm, pleasant, cooperative          TIME SPENT:60  minutes including 24 minutes face-to-face time   counseling his  about his diagnosis and treatment options, and coordinating care   with the primary team.  DECISION-MAKING: The level of decision-making in this case is   high/complex due to the complexity of medical problems,   acute/chronic pain, opioid analgesia issues, and behavioral   factors.    Samantha Lowery PA-C  January 20, 2021, 12:45 PM  Inpatient Pain Management Service             "

## 2021-01-21 NOTE — PROGRESS NOTES
"Upon re-admit to unit pt was exhibiting lethargic behavior, not responding to voice commands or external stimuli. Pt stated that \"he was too weak to respond, move, or open his eyes\". PERRLA, BP normal laying down, sitting, and standing, 02 97% throughout episode, HR remained 74. Pt then said he felt ill (as he did in the CT) both times dry heaving but nothing produced. Given zofran to help with nausea.   "

## 2021-01-21 NOTE — ED NOTES
Pt arrived from CT scan to be assessed by hospitalist doctor in ED room 1. Hospitalist read CT scan and discussing with neurology. Plan to transfer back to Ortho Unit.

## 2021-01-21 NOTE — PLAN OF CARE
"  VS:    /73 (BP Location: Left arm)   Pulse 69   Temp 97.4  F (36.3  C) (Oral)   Resp 16   Ht 1.803 m (5' 11\")   Wt 88.5 kg (195 lb 1.7 oz)   SpO2 97%   BMI 27.21 kg/m      Output:    Voiding spontaneously without difficulty   Activity:    Patient up independently, steady on feet   Skin: Skin intact except surgical incision    Pain:    Patient verbal statement of 7 out of 10 R shoulder area, pain controlled with scheduled Tylenol and PRN Robaxin, Oxy 10 mg, Lidocaine patch on R shoulder   Neuro/CMS:    CMS intact, A&Ox4, calm, cooperative and pleasant    Dressing(s):    Aquacel dressing is clean, dry and intact    Diet:    Regular diet, tolerating well    Equipment:    Shoulder Immobilizer in place at all times   IV's/Drains:    L PIV patent, saline locked    Plan:    Will continue to monitor    Additional Info:    Patient had increased pain, received Nerve Block on evening shift, pain is well managed now, will continue to monitor          0530: Patient requested to ambulate in the hallway. During ambulation, patient felt dizzy and lightheaded. Primary RN gave patient fluids to drink, patient then felt better. Patient proceeded to ambulate and began to feel dizzy and unsteady on feet. Charge RN and NST transferred patient to wheelchair and returned patient to room. Patient began to hyperventilate and complained of nausea and severe pain. Staff transferred patient into bed and paged Moonlighter. Vital signs stable. Moonlighter ordered EKG which came back normal. Hyperventilation subsided after IV Zofran administration. No additional intervention at this time per MD. Will continue to monitor.   "

## 2021-01-21 NOTE — PROGRESS NOTES
Orthopaedic Surgery Progress Note 01/21/2021    S:  Up ambulating about 0600 and became lightheaded. Patient seen in bed - appears uncomfortable, eyes closed, quiet voice. Denies tingling. Having pain in neck, shoulder. Denies chest pain, nausea/vomiting. A bit SOB and says he takes albuterol at home for asthma. Tolerating regulard diet. -BM. Voiding spontaneously.      Interval events: RAPS placed a 2nd block yesterday in PACU.    O:  Temp: 97.4  F (36.3  C) Temp src: Oral BP: 139/86 Pulse: 70   Resp: 16 SpO2: 100 % O2 Device: None (Room air)    AVSS    Exam:  Gen: No acute distress, resting comfortably in bed.  Resp: Non-labored breathing. Capnography in place.   MSK:  RUE:  - Dressings c/d/i  - ultrasling in place  - SILT medial/radial/ulnar/axillary nerves. Not normal sensation  (2/2 block).  - Fires EPL, FPL, Intrinsics. Refuses to fire deltoid.   - Radial pulse 2+, hand wwp    Recent Labs   Lab 01/21/21  0625 01/20/21  0515   WBC 11.5*  --    HGB 14.7 17.1     --        Imaging  No new imaging.    Assessment: Dolores Robles is a 25 year old male s/p Latarjet and grafting of humeral head, R, on 1/19/21 with Dr. Patel. Working on pain control.    Up ambulating this AM and around 0600 became light headed. Returned to room in wheelchair and evaluated by medicine and me - vitals normal, EKG wnl, labs wnl. CTM vitals closely. Medicine thinks unlikely stroke or cardiac pathology but will reassess later in the morning.    Plan:  Ortho Primary  Activity: Up with assist. No shoulder motion until 2 week follow up. Okay for wrist, elbow, hand ROM.  Weight bearing status: NWB RUE x6 weeks.  Antibiotics: Ancef x24 hours perioperatively.  Diet: Begin with clear fluids and progress diet as tolerated.  DVT prophylaxis: mechanical while in the hospital, discharge TBD with Dr. Patel.  No chemoppx at this time. Ortho recommends encouraging ambulation, ankle pumps, diligent SCDs.  Bracing/Splinting: Ultrasling to be  kept clean, dry, and intact until follow-up.  Elevation: Elevate RUE on pillows to keep elevated as much as possible.  Wound Care: Aquacel to remain in place 7 days. Okay to sponge bath upper extremity.   Pain management: transition from IV to orals as tolerated. Working on pain control and multimodal regimen. RAPS placed 2nd block on 1/20.  X-rays: Obtained in PACU.  Physical Therapy: Elbow, wrist, hand ROM, ADL's. NO SHOULDER ROM.  Occupational Therapy: ADL's.  Labs: Trend Hgb on POD #1. Not necessarily.  Consults: PT, OT, appreciate assistance in caring for this patient.  Follow-up: Clinic with Dr. Patel in 2 weeks (on or around 2/1/21) with 4V of R shoulder x-rays needed.  Future Appointments   Date Time Provider Department Center   1/20/2021  9:30 AM Lina Christianson OT [facility] [place]   1/20/2021  1:30 PM Lina Christianson OT [facility] [place]   1/20/2021  7:00 PM UR PT OVERFLOW [facility] [place]   2/3/2021  8:00 AM Екатерина Patel MD [facility]   3/3/2021  8:00 AM Екатерина Patel MD [facility]       Disposition: Pending progress with therapies, pain control on orals, and medical stability, anticipate discharge to home on POD #2 (today).    Staff: Dr. Patel. To be discussed.    Monse Sosa MD  Orthopaedic Surgery PGY-1  Pager [phone]

## 2021-01-21 NOTE — PLAN OF CARE
Occupational Therapy Discharge Summary    Reason for therapy discharge:    All goals and outcomes met, no further needs identified.    Progress towards therapy goal(s). See goals on Care Plan in [facility] electronic health record for goal details.  Goals met    Therapy recommendation(s):    Home with assist from significant other for ADLs/IADLs, continue elbow, wrist, hand HEP

## 2021-01-21 NOTE — PLAN OF CARE
"  VS: /72   Pulse 68   Temp 97.5  F (36.4  C)   Resp 16   Ht 1.803 m (5' 11\")   Wt 88.5 kg (195 lb 1.7 oz)   SpO2 99%   BMI 27.21 kg/m    LS CTA, Denies CP, SOB. AxO x4.   O2: Stable on the RA.   Output: Void spontaneously in the toilet and urinal.   Last BM: 1/18, pt refused, Colace, took senna and miraLax.   Activity: Walked in the hallway with OT, stable gait. However during round with Sara EVANS, pt state his left leg is not at his full strength compared to the right side, and its a new onset after surgery per pt report. Able to ambulate without difficult, with OT.and walks to the bathroom independently.    Skin: Surgery on his right shoulder, otherwise intact.   Pain: PO oxycodone, Toradol.   CMS: Numbness to the surgery arm, and also c/o slight loss of sensation to the left forehead and tingling to the left arm and leg.   Dressing: CDI.   Diet: Regular diet and tolerated well.   LDA: None.   Equipment: Shoulder sling, IV pole and pump, continues pulse oximeter.   Plan: Home after the neurology consult.   Additional Info: IM consulted neurology. At 11am pt wanted to go home, he state\" I am fine how soon can I get out of here\"? Writer, explained the pt he is not medically stable to go home, Medicine and Neuro, called stroke code at 12pm, and pt sent to the CT, and will be sent to ER for evaluation after.     Patient vital signs are at baseline: Yes  Patient able to ambulate as they were prior to admission or with assist devices provided by therapies during their stay:  Yes  Patient MUST void prior to discharge:  Yes  Patient able to tolerate oral intake:  Yes  Pain has adequate pain control using Oral analgesics:  Yes    "

## 2021-01-21 NOTE — UTILIZATION REVIEW
"  Admission Status; Secondary Review Determination         Under the authority of the Utilization Management Committee, the utilization review process indicated a secondary review on the above patient.  The review outcome is based on review of the medical records, discussions with staff, and applying clinical experience noted on the date of the review.       (x) Observation Status Appropriate - This patient does not meet hospital inpatient criteria and is placed in observation status. If this patient's primary payer is Medicare and was admitted as an inpatient, Condition Code 44 should be used and patient status changed to \"observation\".     RATIONALE FOR DETERMINATION   The patient is a 25-year-old male who was admitted for grafting of the left humeral head and later jet procedure.  Tolerated the procedure well.  This was done on 1/19/2021.  Following the procedure he was extremely fatigued and developed alternating facial numbness and left-sided weakness.  This was evaluated by neurology for possible stroke which was ruled out.  The patient received a CT and CTA which were negative.  Etiology was unclear to neurology and they signed off the case.  Recommendation from neurology was to obtain a MRI scan with contrast to rule out stroke if symptoms recurred or worsened.  Patient apparently also had a presyncopal episode and evaluation by medicine did not show a specific diagnosis.  Currently the patient is listed as outpatient class.  Recommendation would be to advance to observation status.  If a specific diagnosis is made requiring acute hospital management, advancing to inpatient status would be appropriate at that time. Discussed with Dr Sosa will switch to Obs.  Pain control has also been challenging.       The severity of illness, intensity of service provided, expected LOS and risk for adverse outcome make the care complex, high risk and appropriate for hospital admission.        The information on this " document is developed by the utilization review team in order for the business office to ensure compliance.  This only denotes the appropriateness of proper admission status and does not reflect the quality of care rendered.         The definitions of Inpatient Status and Observation Status used in making the determination above are those provided in the CMS Coverage Manual, Chapter 1 and Chapter 6, section 70.4.      Sincerely,     Hussain Roldan MD  Physician Advisor  Utilization Review/ Case Management  [facility].

## 2021-01-21 NOTE — PROGRESS NOTES
Prior Authorization **DENIED**    Medication: Lidocaine 5% patches **DENIED**  Denial Date: 1/21/2021  Reference #: CoverMyMeds Key: [id number]  Denial Rational:   In addition to other pain-related medications patient has taken, he must have a sufficient trial/failure of Gabapentin (titration of up to 2400mg/day), Duloxetine, Tricyclic Antidepressant, and a covered topical analgesic/pain med.  Appeal Information: n/a  Comments:  Lidocaine 5% ointment preferred. Lidocaine 4% patches available over the counter. Pharmacy reaching out to provider for change.      Amanda Tobar CPhT  [place] [facility] Liaison  Pronouns: She/Her/Hers    [facility]  [address] [place] [address]  [address] [place], [place] [id number]   [email or web address]   Phone: [phone]  Pager: [phone]  Fax: [phone]

## 2021-01-21 NOTE — PROGRESS NOTES
Stroke code:       Stroke code called for left sided weakness.  Patient reports he is not aware of when weakness started but thinks it may have started this AM  Patient not sure when these symptoms started.     On my initial evaluation around 10 AM, patient's main symptoms were off numbness on the left side of his face.   On signs out, I was told he had numbness on the right side of face, and low suspicion for stroke  His neuro exam was unremarkable.    On later evaluation, patient mentioned weakness of the left side of the body  Strength 4+/5 on both Left upper and lower extremity  Patient denied any numbness  Neurology was consulted earlier, and suggested need for ruling out stroke    Case was discussed with Stroke team over the phone  CT head non contrast, CTA Head and Neck, and CT Perfusion was done. It was unremarkable.   He was transferred back to Floor. All these finding were discussed with Neurology    Edward Ruiz MD  [facility]   Contact information available via [facility] Paging/Directory

## 2021-01-21 NOTE — PLAN OF CARE
Received page regarding Dolores, who sustained a syncopal episode while out of bed this morning.     He was able to be returned to bed by the nursing team.  He is currently nauseous and diaphoretic, but coherent.  Vital signs are stable (BP 130s/80s, HR 70s, O2 sat 100%)    I alerted the resident seeing Dolores this morning about the episode and placed an internal medicine consultation for any further recommendations.  Will also order fluids and morning labs to be drawn and confirm no metabolic abnormalities.    Steve Duncan MD  Orthopaedic Surgery PGY-4  #: [phone]

## 2021-01-21 NOTE — PROVIDER NOTIFICATION
Ortho on call Resident paged regarding patient's pain control, requested decreased time from Q4h to Q3h per patient's pain not being controlled, patient requested IV Dilaudid. PO Benadryl also order d/t patient's c/o of itching.

## 2021-01-21 NOTE — PROGRESS NOTES
"[facility] [place]   Internal Medicine Daily Note           Interval History/Events     Overnight events reviewed  Hand off taken from            Review of Systems        4 point ROS including Respiratory, CV, GI and , other than that noted above is negative      Medications   I have reviewed current medications  in the \"current medication\" section of Epic.  Relevant changes include:     Physical Exam   General:       Vital signs:    Blood pressure 135/87, pulse 72, temperature 98.8  F (37.1  C), temperature source Oral, resp. rate 16, height 1.803 m (5' 11\"), weight 88.5 kg (195 lb 1.7 oz), SpO2 97 %.  Estimated body mass index is 27.21 kg/m  as calculated from the following:    Height as of this encounter: 1.803 m (5' 11\").    Weight as of this encounter: 88.5 kg (195 lb 1.7 oz).    No intake or output data in the 24 hours ending 01/21/21 1551     Constitutional:   Eye:   Mouth/ENT:   Cardiovascular:   Respiratory:   GI:   :   Neurology:   Psych:   MSK:   Integumentary:   Heme/Lymph/Imm:      Laboratory and Imaging Studies     I have reviewed  laboratory and imaging studies in the Epic. Pertinent findings are as below:    BMP  Recent Labs   Lab 01/21/21  0625      POTASSIUM 4.1   CHLORIDE 107   JENNY 8.5   CO2 28   BUN 14   CR 0.94   *     CBC  Recent Labs   Lab 01/21/21  0625   WBC 11.5*   RBC 4.97   HGB 14.7   HCT 44.2   MCV 89   MCH 29.6   MCHC 33.3   RDW 12.6        INRNo lab results found in last 7 days.  LFTs  Recent Labs   Lab 01/21/21  0625   ALKPHOS 66   AST 24   ALT 20   BILITOTAL 0.5   PROTTOTAL 6.6*   ALBUMIN 3.6      PANCNo lab results found in last 7 days.        Impression/Plan      25 year old man with history of right shoulder instability and history of testicular torsion who recently underwent Latarjet and grafting of R humeral head on 1/19/21. Requested by orthopedics service to see patient related to concern by nursing for a presyncopal " episode.      Presyncopal Episode:   Differential for episode described is broad, including cardiac, neurologic, medication-induced, or vasovagal. Doubt related to hypovolemia given normotensive/normal heart rates surrounding event. Neurologic examination notable for an decreased sensation of right side of face, head and neck, though patient's participation in exam is inconsistent and it is unclear to me whether anesthetics used for blocks in pain control may be related. Most likely this is related to increases in pain medications, but will send further workup.    Later  Stroke code was called. Please see stroke note  Case was discussed with Stroke team. CT head, CTA head and neck, CT Perfusion was normal.   Patient's hx is very unreliable. Lim's sign was present. Unsure if there is component of somatization, obviously we will pursue for all the possible organic etiology.   Continue to monitor,and follow up  Neuro check every 4 hours.           Pt's care was discussed with bedside RN, patient and  during Care Team Rounds.               Edward Ruiz MD  Hospitalist ( Internal medicine)  Pager: [phone]

## 2021-01-21 NOTE — CONSULTS
"[facility]     Stroke Telephone Note    I was called by Dr. Екатерина Patel on 01/21/21 at 1237 regarding patient Dolores Robles. The patient is a 25 year old male who stroke page is called on for alternating facial numbness and left sided weakness. Patient outside window for TPA. CT/CTA essentially normal.    Stroke Code Data  (for stroke code without tele)  Stroke code activated 01/21/21   1237   First stroke provider response 01/21/21   1237   Last known normal 01/21/21   0600   Time of discovery   (or onset of symptoms) 01/21/21   1322   Head CT read by me 01/21/21   1320   Was stroke code de-escalated? Yes 01/21/21 1323  patient is outside emergent treatment time parameters       TPA Treatment   Not given due to unclear or unfavorable risk-benefit profile for extended window thrombolysis beyond the conventional 4.5 hour time window.    Endovascular Treatment  Not initiated due to absence of proximal vessel occlusion    Impression  Alternating Facial Numbness and Left sided weakness   Etiology is unclear  CT as above     Recommendations  If symptoms persists, obtain MRI brain w/o contrast to r/o stroke.   Thank you consult, will sign off at this time.   Please page with any additional question.     The Stroke Staff is Dr. Crooks.    Emiliano Larsen MD  Vascular Neurology Fellow  To page me or covering stroke neurology team member, click here: [email or web address]   Choose \"On Call\" tab at top, then search dropdown box for \"Neurology Adult\", select location, press Enter, then look for stroke/neuro ICU/telestroke.           "

## 2021-01-22 VITALS
BODY MASS INDEX: 27.31 KG/M2 | SYSTOLIC BLOOD PRESSURE: 125 MMHG | OXYGEN SATURATION: 96 % | HEIGHT: 71 IN | HEART RATE: 61 BPM | WEIGHT: 195.11 LBS | RESPIRATION RATE: 16 BRPM | TEMPERATURE: 97.6 F | DIASTOLIC BLOOD PRESSURE: 78 MMHG

## 2021-01-22 LAB
ANION GAP SERPL CALCULATED.3IONS-SCNC: 6 MMOL/L (ref 3–14)
BASOPHILS # BLD AUTO: 0 10E9/L (ref 0–0.2)
BASOPHILS NFR BLD AUTO: 0.5 %
BUN SERPL-MCNC: 12 MG/DL (ref 7–30)
CALCIUM SERPL-MCNC: 8.5 MG/DL (ref 8.5–10.1)
CHLORIDE SERPL-SCNC: 107 MMOL/L (ref 94–109)
CO2 SERPL-SCNC: 28 MMOL/L (ref 20–32)
CREAT SERPL-MCNC: 1.02 MG/DL (ref 0.66–1.25)
DIFFERENTIAL METHOD BLD: NORMAL
EOSINOPHIL # BLD AUTO: 0.1 10E9/L (ref 0–0.7)
EOSINOPHIL NFR BLD AUTO: 1.1 %
ERYTHROCYTE [DISTWIDTH] IN BLOOD BY AUTOMATED COUNT: 12.8 % (ref 10–15)
GFR SERPL CREATININE-BSD FRML MDRD: >90 ML/MIN/{1.73_M2}
GLUCOSE SERPL-MCNC: 95 MG/DL (ref 70–99)
HCT VFR BLD AUTO: 45.4 % (ref 40–53)
HGB BLD-MCNC: 15 G/DL (ref 13.3–17.7)
IMM GRANULOCYTES # BLD: 0 10E9/L (ref 0–0.4)
IMM GRANULOCYTES NFR BLD: 0.2 %
LYMPHOCYTES # BLD AUTO: 2.5 10E9/L (ref 0.8–5.3)
LYMPHOCYTES NFR BLD AUTO: 30.5 %
MCH RBC QN AUTO: 29.8 PG (ref 26.5–33)
MCHC RBC AUTO-ENTMCNC: 33 G/DL (ref 31.5–36.5)
MCV RBC AUTO: 90 FL (ref 78–100)
MONOCYTES # BLD AUTO: 0.8 10E9/L (ref 0–1.3)
MONOCYTES NFR BLD AUTO: 9.6 %
NEUTROPHILS # BLD AUTO: 4.8 10E9/L (ref 1.6–8.3)
NEUTROPHILS NFR BLD AUTO: 58.1 %
NRBC # BLD AUTO: 0 10*3/UL
NRBC BLD AUTO-RTO: 0 /100
PLATELET # BLD AUTO: 195 10E9/L (ref 150–450)
POTASSIUM SERPL-SCNC: 4.3 MMOL/L (ref 3.4–5.3)
RBC # BLD AUTO: 5.03 10E12/L (ref 4.4–5.9)
SODIUM SERPL-SCNC: 141 MMOL/L (ref 133–144)
WBC # BLD AUTO: 8.2 10E9/L (ref 4–11)

## 2021-01-22 PROCEDURE — 85025 COMPLETE CBC W/AUTO DIFF WBC: CPT | Performed by: INTERNAL MEDICINE

## 2021-01-22 PROCEDURE — 99225 PR SUBSEQUENT OBSERVATION CARE,LEVEL II: CPT | Performed by: INTERNAL MEDICINE

## 2021-01-22 PROCEDURE — 99207 PR CDG-CODE CATEGORY CHANGED: CPT | Performed by: INTERNAL MEDICINE

## 2021-01-22 PROCEDURE — 250N000013 HC RX MED GY IP 250 OP 250 PS 637: Performed by: CLINICAL NURSE SPECIALIST

## 2021-01-22 PROCEDURE — G0378 HOSPITAL OBSERVATION PER HR: HCPCS

## 2021-01-22 PROCEDURE — 250N000013 HC RX MED GY IP 250 OP 250 PS 637: Performed by: ORTHOPAEDIC SURGERY

## 2021-01-22 PROCEDURE — 36415 COLL VENOUS BLD VENIPUNCTURE: CPT | Performed by: INTERNAL MEDICINE

## 2021-01-22 PROCEDURE — 80048 BASIC METABOLIC PNL TOTAL CA: CPT | Performed by: INTERNAL MEDICINE

## 2021-01-22 RX ADMIN — OXYCODONE HYDROCHLORIDE 5 MG: 5 TABLET ORAL at 06:54

## 2021-01-22 RX ADMIN — METHOCARBAMOL 750 MG: 750 TABLET ORAL at 00:37

## 2021-01-22 RX ADMIN — GABAPENTIN 300 MG: 300 CAPSULE ORAL at 08:21

## 2021-01-22 RX ADMIN — OXYCODONE HYDROCHLORIDE 5 MG: 5 TABLET ORAL at 11:58

## 2021-01-22 RX ADMIN — ACETAMINOPHEN 975 MG: 325 TABLET ORAL at 08:22

## 2021-01-22 RX ADMIN — METHOCARBAMOL 750 MG: 750 TABLET ORAL at 10:26

## 2021-01-22 NOTE — PLAN OF CARE
"  VS:    /53   Pulse 75   Temp 97.2  F (36.2  C) (Oral)   Resp 16   Ht 1.803 m (5' 11\")   Wt 88.5 kg (195 lb 1.7 oz)   SpO2 97%   BMI 27.21 kg/m      Output:    Patient voiding spontaneously without difficulty.   Activity:    Patient ambulating in room independently, gait is steady. Patient ambulating in hallway under supervision of NST, gait steady. Denies SOB, dizziness or nausea while ambulating.    Skin: Intact and dry.   Pain:    Pain controlled with robaxin PRN, lidocaine patches, and scheduled ibuprofen.    Neuro/CMS:    Patient is A&Ox4. Denies any tingling or numbness   Dressing(s):    R shoulder surgical dressing CDI   Diet:    Regular diet, tolerating well   Equipment:    Gait belt, walker   IV's/Drains:    L PIV, saline locked, patent, dressing CDI.    Plan:    Possibly discharge 1/22 per Ortho Resident.    Additional Info:               "

## 2021-01-22 NOTE — PROGRESS NOTES
Orthopaedic Surgery Progress Note 01/22/2021    S: Sleeping this AM. Appears comfortable. Endorses pain in R shoulder. Tolerating regulard diet. -BM. Voiding spontaneously.      Interval events: stroke called yesterday for reports of L sided weakness and numbness in V1 distribution. Medicine placed neuro consult. CTA head, neck negative. Neuro signed off.    Admitted to Obs, per utilization team review.    O:  Temp: 97.2  F (36.2  C) Temp src: Oral BP: 122/53 Pulse: 75   Resp: 16 SpO2: 97 % O2 Device: None (Room air)    AVSS    Exam:  Gen: No acute distress, resting comfortably in bed. Room cold (60s).  Resp: Non-labored breathing. Capnography in place.   MSK:  RUE:  - Dressings c/d/i  - ultrasling in place  - SILT medial/radial/ulnar/axillary nerves. Not normal sensation  (2/2 block).  - Fires EPL, FPL, Intrinsics. Refuses to fire deltoid.   - Radial pulse 2+, hand wwp    Recent Labs   Lab 01/22/21  0529 01/21/21  0625 01/20/21  0515   WBC 8.2 11.5*  --    HGB 15.0 14.7 17.1    200  --        Imaging  No new imaging.    Assessment: Dolores Robles is a 25 year old male s/p Latarjet and grafting of humeral head, R, on 1/19/21 with Dr. Patel.     Plan:  Ortho Primary  Activity: Up with assist. No shoulder motion until 2 week follow up. Okay for wrist, elbow, hand ROM.  Weight bearing status: NWB RUE x6 weeks.  Antibiotics: Ancef x24 hours perioperatively.  Diet: Begin with clear fluids and progress diet as tolerated.  DVT prophylaxis: mechanical while in the hospital. No chemoppx at this time. Ortho recommends encouraging ambulation, ankle pumps, diligent SCDs.  Bracing/Splinting: Ultrasling to be kept clean, dry, and intact until follow-up.  Elevation: Elevate RUE on pillows to keep elevated as much as possible.  Wound Care: Aquacel to remain in place 7 days. Okay to sponge bath upper extremity.   Pain management: transition from IV to orals as tolerated. Working on pain control and multimodal regimen --  robaxin, tylenol, ibuprofen, oxycodone.  X-rays: Obtained in PACU.  Physical Therapy: Elbow, wrist, hand ROM, ADL's. NO SHOULDER ROM.  Occupational Therapy: ADL's.  Labs: Trend Hgb on POD #1. Daily labs per medicine.  Consults: PT, OT, medicine, neurology appreciate assistance in caring for this patient.  Follow-up: Clinic with Dr. Patel in 2 weeks (on or around 2/1/21) with 4V of R shoulder x-rays needed.  Future Appointments   Date Time Provider Department Center   1/20/2021  9:30 AM Lina Christianson OT [facility] [place]   1/20/2021  1:30 PM Lina Christianson OT [facility] [place]   1/20/2021  7:00 PM UR PT OVERFLOW [facility] [place]   2/3/2021  8:00 AM Екатерина Patel MD [facility]   3/3/2021  8:00 AM Екатерина Patel MD [facility]       Disposition: Pending progress with therapies, pain control on orals, and medical stability, anticipate discharge to home on POD3 (today).    Staff: Dr. Patel. To be discussed.    Monse Sosa MD  Orthopaedic Surgery PGY-1  Pager [phone]

## 2021-01-22 NOTE — DISCHARGE SUMMARY
Pt. discharged at 1330 to home. Pt. personal belongings. Prior to discharge, PIV was removed. Pt. received complete discharge paperwork and  medications as filled by discharge pharmacy. Pt. was given times of last dose for all discharge medications in writing on discharge medication sheets.  Discharge teaching includedmedication, pain management, activity restrictions, dressing changes, and signs and symptoms of infection. Pt. to follow up with Dr Patel in 2 weeks. Pt had asked question about use of marijuana with his medications to staff and staff reported that they will not answer this question since marijuana is not a legal drug.  Pt. had no further questions at the time of discharge and no unmet needs were identified.

## 2021-01-22 NOTE — PROGRESS NOTES
VS: VSS   O2: Room air >90%   Output: Voids adequately   Last BM: Per pt report 1/18. Pt refused bowel medications and was educated on their importance. Passing gas    Activity: SBA. Pt has steady gait   Up for meals? yes   Skin: Incision on R shoulder   Pain: Managed by PRN oxycodone q4 and tylenol    CMS: intact   Dressing: Dried drainage. Small and marked   Diet: Regular    LDA: PIV in left hand SL    Equipment: Immobilizer    Plan: Discharge pt   Additional Info: Pt has had no episodes of fainting today

## 2021-01-22 NOTE — PROGRESS NOTES
"[facility], [place]   Internal Medicine Daily Note           Interval History/Events     Overnight events reviewed  Reports doing well  No nausea, vomiting, chest pain, shortness of breath, lightheadedness or dizziness  No fever, chills.        Review of Systems        4 point ROS including Respiratory, CV, GI and , other than that noted above is negative      Medications   I have reviewed current medications  in the \"current medication\" section of Epic.  Relevant changes include:     Physical Exam   General:       Vital signs:    Blood pressure 125/78, pulse 61, temperature 97.6  F (36.4  C), resp. rate 16, height 1.803 m (5' 11\"), weight 88.5 kg (195 lb 1.7 oz), SpO2 96 %.  Estimated body mass index is 27.21 kg/m  as calculated from the following:    Height as of this encounter: 1.803 m (5' 11\").    Weight as of this encounter: 88.5 kg (195 lb 1.7 oz).      Intake/Output Summary (Last 24 hours) at 1/22/2021 1221  Last data filed at 1/22/2021 0653  Gross per 24 hour   Intake --   Output 450 ml   Net -450 ml        Constitutional: Laying in bed in no acute distress  Eye: No icterus, no pallor  Mouth/ENT: Normal oral mucosa  Cardiovascular: S1, S2 normal.   Respiratory: B/L CTA  GI: Soft, NT, BS+  : No maldonado's catheter  Neurology: Alert, awake, and oriented. No focal neuro deficit.  Left Upper extremity, and Lower extremity strength 5/5   Psych: Mood stable.   MSK:   Integumentary:   Heme/Lymph/Imm:      Laboratory and Imaging Studies     I have reviewed  laboratory and imaging studies in the Epic. Pertinent findings are as below:    BMP  Recent Labs   Lab 01/22/21  0529 01/21/21  0625    140   POTASSIUM 4.3 4.1   CHLORIDE 107 107   JENNY 8.5 8.5   CO2 28 28   BUN 12 14   CR 1.02 0.94   GLC 95 107*     CBC  Recent Labs   Lab 01/22/21  0529 01/21/21  0625   WBC 8.2 11.5*   RBC 5.03 4.97   HGB 15.0 14.7   HCT 45.4 44.2   MCV 90 89   MCH 29.8 29.6   MCHC 33.0 33.3   RDW 12.8 12.6 "    200     INRNo lab results found in last 7 days.  LFTs  Recent Labs   Lab 01/21/21  0625   ALKPHOS 66   AST 24   ALT 20   BILITOTAL 0.5   PROTTOTAL 6.6*   ALBUMIN 3.6      PANCNo lab results found in last 7 days.        Impression/Plan        25 year old man with history of right shoulder instability and history of testicular torsion who recently underwent Latarjet and grafting of R humeral head on 1/19/21. Requested by orthopedics service to see patient related to concern by nursing for a presyncopal episode.      Presyncopal Episode:   Differential for episode described is broad, including cardiac, neurologic, medication-induced, or vasovagal. Doubt related to hypovolemia given normotensive/normal heart rates surrounding event. Neurologic examination notable for an decreased sensation of right side of face, head and neck, though patient's participation in exam is inconsistent and it is unclear to me whether anesthetics used for blocks in pain control may be related. Most likely this is related to increases in pain medications, but will send further workup.     Later  Stroke code was called. Please see stroke note  Case was discussed with Stroke team. CT head, CTA head and neck, CT Perfusion was normal.   Lim's sign was present. Unsure if there is component of somatization    On 01/22 morning patient denies any numbness or tingling. Reports weakness of his left side is normal. Feels he is back to his baseline.           Pt's care was discussed with bedside RN, patient and  during Care Team Rounds.               Edward Ruiz MD  Hospitalist ( Internal medicine)  Pager: [phone]

## 2021-01-24 ENCOUNTER — PATIENT OUTREACH (OUTPATIENT)
Dept: CARE COORDINATION | Facility: CLINIC | Age: 26
End: 2021-01-24

## 2021-01-25 LAB — INTERPRETATION ECG - MUSE: NORMAL

## 2021-01-26 NOTE — PROGRESS NOTES
Attempted to contact the patient x3 for post-hospital call without answer. Will close encounter at this time.    Angela Martínez CMA, [facility]  Post Hospital Discharge Team

## 2021-01-27 NOTE — OP NOTE
DATE OF PROCEDURE: 1/19/21    PREOPERATIVE DIAGNOSES:   1. Right recurrent shoulder instability.   2. Right shoulder Hill Sachs  3. Right glenoid attritional bone loss     POSTOPERATIVE DIAGNOSES:   1. Right recurrent shoulder instability.   2. Right shoulder Hill Sachs  3. Right glenoid attritional bone loss    PROCEDURES:   1. Right shoulder humeral head allograft   2. Right open Latarjet    STAFF SURGEON: Екатерина Patel MD   ASSISTANTS: Monse Sosa MD    ANESTHESIA: General endotracheal anesthesia with supplementary interscalene block.   ESTIMATED BLOOD LOSS: 40 mL.     BRIEF PATIENT HISTORY: Dolores is a 25-year-old who has a long history of right shoulder instability. Has had many episodes of shoulder instability and has now developed attritional glenoid bone loss, increasing his instability risks and events.  Given the patient's ongoing and increasing instability, we discussed the option of humeral head allograft and a Latarjet. We discussed the surgical risks including risk of bleeding, infection, injuries to nerves/vessels which power the arm or hand. We discussed the risks of ongoing instability as well as the risks of anesthesia. We discussed expected postoperative restrictions. The patient had the opportunity for his questions to be answered and he wished to proceed to surgery as outlined above.     PROCEDURE: The patient was identified in the preoperative area and the correct right shoulder was marked for surgery. The patient was provided an interscalene block by our anesthesia colleagues and was taken to the operative room where he was surrendered to general endotracheal anesthesia and positioned in beach chair with all bony prominences well padded. His head was placed in a head pederson with the neck in neutral position. The right upper extremity was prepped and draped in the usual sterile fashion. A timeout was held in accordance with hospital policy, confirming correct patient, side, site, procedure  and administration of IV antibiotics prior to incision. I made an incision in line with the axillary crease extending up towards the coracoid. I opened the deltopectoral interval, taking the cephalic vein laterally. I palpated the axillary nerve medially and laterally performing a tug test confirming location and continuity of the axillary nerve. This was performed multiple times throughout the procedure including once prior to closure. I released the subscapularis transtendinously and then released the capsule off of the subscapularis. I made a T incision in the capsule.  Dislocated the humeral head anteriorly and inspected the Hill Sachs posteriorly. I debrided the bone edge to prepare the site for the bone graft. I used bone wax to create a model of the bone defect. I then used this to craft a similar shaped bone graft from a femoral head allograft. After cutting the graft to the correct shape I prepared the graft with pulse lavage. I then secured the graft with 2 bio headless compression screws.   I then turned my attention to the Latarjet.   I carefully dissected the pec minor off the medial aspect of the coracoid and then the CA ligament laterally. I measured to the base of the coracoid and used a 90 degree saw blade to mobilize the coracoid. I carefully dissected the coracoid free, taking care to protect the neurovascular structures medially and distally. I then prepared the glenoid neck for the coracoid graft.    I placed a Fukuda within the joint and then removed the callous on the glenoid neck from the attritional bone loss. I passed the coracoid through the subscap split and affixed the graft at the appropriate location low on the glenoid with care to avoid the graft being placed proud or too recessed. The graft (previously prepared with drill holes was pinned in place. The two partially threaded screws were passed to securely affix the coracoid in good position. The neurologic structures were palpated  and protected throughout.   I then repaired the capsule laterally performing a capsulorrhaphy with #2 FiberWire. I then repaired the subscapularis using #2 FiberWire. The wound was copiously irrigated and closed in layered fashion with Monocryl. Steri-Strips and soft dressings were applied. The arm was placed into an abduction sling. The patient was extubated and transported to recovery in stable condition. There were no apparent intraoperative complications.     POSTOPERATIVE PLAN:   1. The patient will be discharged to home when he meets same day discharge criteria.   2. The patient will have active hand, wrist and elbow range of motion only.   3. The patient will start gentle PT at 3-4 weeks with passive range of motion to include FE to 90 and ER to 20. At 4 weeks he will progress FE to 120 and ER to 40. He will have unlimited PROM at 6 weeks and progress to AAROM. He will do AROM at 8 weeks.

## 2021-02-03 ENCOUNTER — OFFICE VISIT (OUTPATIENT)
Dept: ORTHOPEDICS | Facility: CLINIC | Age: 26
End: 2021-02-03
Payer: COMMERCIAL

## 2021-02-03 DIAGNOSIS — M25.30 RECURRENT INSTABILITY OF JOINT: ICD-10-CM

## 2021-02-03 DIAGNOSIS — G89.18 POSTOPERATIVE PAIN: ICD-10-CM

## 2021-02-03 PROCEDURE — 99024 POSTOP FOLLOW-UP VISIT: CPT | Performed by: ORTHOPAEDIC SURGERY

## 2021-02-03 RX ORDER — OXYCODONE HYDROCHLORIDE 5 MG/1
TABLET ORAL
Qty: 20 TABLET | Refills: 0 | Status: SHIPPED | OUTPATIENT
Start: 2021-02-03

## 2021-02-03 RX ORDER — GABAPENTIN 250 MG/5ML
300 SOLUTION ORAL 3 TIMES DAILY
Qty: 220 ML | Refills: 0 | Status: SHIPPED | OUTPATIENT
Start: 2021-02-03

## 2021-02-03 RX ORDER — IBUPROFEN 600 MG/1
600 TABLET, FILM COATED ORAL EVERY 6 HOURS PRN
Qty: 30 TABLET | Refills: 0 | Status: SHIPPED | OUTPATIENT
Start: 2021-02-03

## 2021-02-03 NOTE — PROGRESS NOTES
"CHIEF CONCERN: Status post right open Latarjet, humeral head allograft  DATE OF SURGERY: 1/19/21    HISTORY OF PRESENT ILLNESS: Mr. Robles is a 25-year-old male who presents for 2 week clinic follow up s/p the above procedure.  He has been wearing his sling and he presents in a simple sling today.  He reports he is moving his fingers, wrist, and elbow.  His shoulder is sore as well as his \"upper spine\".  He also reports that sleeping has been challenging.  He has been sleeping flat on his back.    PHYSICAL EXAM:    General:  No acute distress.  Resting comfortably.  HEENT: Face symmetric.  Respiratory: Nonlabored breathing on room air  MSK:  Right upper extremity:  - wearing simple sling  - Incision is healing well, no areas of dehiscence or drainage. No erythema.  - Sensory intact to light touch in median, radial, ulnar and axillary distributions.  - Fires bicep, sets deltoid weakly so difficult to fully assess deltoid function today.    - Hand warm, well perfused    IMAGING:  No new imaging today.  Reviewed his postoperative imaging from the PACU.     ASSESSMENT:  1. 2 weeks s/p open latarjet, humeral head allograft, R shoulder  2.  Postoperative pain.    PLAN:  Overall, he is doing well.  We recommended he try some pillows to elevate his head and shoulders when sleeping.  We will refill his ibuprofen, gabapentin syrup, and a limited amount of oxycodone.  He will begin physical therapy in 1-2 weeks and he will see the physical therapists at [facility] because this location is more convenient for him.  Physical therapy per operative note from Dr. Patel.  We will see him for his 6-week follow-up on March 3, 2021.     Monse Sosa MD, PGY-1  Orthopedics    The  was checked and confirmed no narcotic scripts filled since hospital discharge. Provided a weaning script of oxycodone today.  I discussed that we will need to wean down narcotics. The oxycodone frequency was further decreased today. " Next refill if requested would need to step down from oxycodone to norco or ultram. No opioids beyond 4 weeks postop. I outlined this plan with the patient.  I have personally examined this patient and have reviewed the clinical presentation and progress note with the resident.  I agree with the treatment plan as outlined.  The plan was formulated with the resident on the day of the resident's note.   Екатерина Patel MD

## 2021-02-03 NOTE — LETTER
Date:February 5, 2021      Patient was self referred, no letter generated. Do not send.        [facility] Health Information

## 2021-02-03 NOTE — LETTER
"    2/3/2021         RE: Dolores Robles  [address]  [facility] [address]        Dear Colleague,    Thank you for referring your patient, Dolores Robles, to the [facility] [place]. Please see a copy of my visit note below.    CHIEF CONCERN: Status post right open Latarjet, humeral head allograft  DATE OF SURGERY: 1/19/21    HISTORY OF PRESENT ILLNESS: Mr. Robles is a 25-year-old male who presents for 2 week clinic follow up s/p the above procedure.  He has been wearing his sling and he presents in a simple sling today.  He reports he is moving his fingers, wrist, and elbow.  His shoulder is sore as well as his \"upper spine\".  He also reports that sleeping has been challenging.  He has been sleeping flat on his back.    PHYSICAL EXAM:    General:  No acute distress.  Resting comfortably.  HEENT: Face symmetric.  Respiratory: Nonlabored breathing on room air  MSK:  Right upper extremity:  - wearing simple sling  - Incision is healing well, no areas of dehiscence or drainage. No erythema.  - Sensory intact to light touch in median, radial, ulnar and axillary distributions.  - Fires bicep, sets deltoid weakly so difficult to fully assess deltoid function today.    - Hand warm, well perfused    IMAGING:  No new imaging today.  Reviewed his postoperative imaging from the PACU.     ASSESSMENT:  1. 2 weeks s/p open latarjet, humeral head allograft, R shoulder  2.  Postoperative pain.    PLAN:  Overall, he is doing well.  We recommended he try some pillows to elevate his head and shoulders when sleeping.  We will refill his ibuprofen, gabapentin syrup, and a limited amount of oxycodone.  He will begin physical therapy in 1-2 weeks and he will see the physical therapists at [facility] because this location is more convenient for him.  Physical therapy per operative note from Dr. Patel.  We will see him for his 6-week follow-up on March 3, 2021.     Monse Sosa, " MD, PGY-1  Orthopedics    The  was checked and confirmed no narcotic scripts filled since hospital discharge. Provided a weaning script of oxycodone today.  I discussed that we will need to wean down narcotics. The oxycodone frequency was further decreased today. Next refill if requested would need to step down from oxycodone to norco or ultram. No opioids beyond 4 weeks postop. I outlined this plan with the patient.  I have personally examined this patient and have reviewed the clinical presentation and progress note with the resident.  I agree with the treatment plan as outlined.  The plan was formulated with the resident on the day of the resident's note.   Екатерина Patel MD      Again, thank you for allowing me to participate in the care of your patient.        Sincerely,        Екатерина Patel MD
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
## (undated) DEVICE — BONE WAX 2.5GM W31G

## (undated) DEVICE — ESU GROUND PAD ADULT W/CORD E7507

## (undated) DEVICE — SOL HYDROGEN PEROXIDE 3% 4OZ BOTTLE F0010

## (undated) DEVICE — PACK SET-UP STD 9102

## (undated) DEVICE — GLOVE PROTEXIS W/NEU-THERA 7.5  2D73TE75

## (undated) DEVICE — DRILL BIT

## (undated) DEVICE — SU MONOCRYL 2-0 SH 27" UND Y417H

## (undated) DEVICE — DRAPE U-POUCH 34X29" 1067

## (undated) DEVICE — SUCTION IRR SYSTEM W/O TIP INTERPULSE HANDPIECE 0210-100-000

## (undated) DEVICE — BONE CLEANING TIP INTERPULSE  0210-010-000

## (undated) DEVICE — PEN MARKING SKIN W/PAPER RULER 31145785

## (undated) DEVICE — POSITIONER ARMBOARD FOAM 1PAIR LF FP-ARMB1

## (undated) DEVICE — DRAPE U-DRAPE 1015NSD NON-STERILE

## (undated) DEVICE — IMM KIT SHOULDER STABILIZATION 7210573

## (undated) DEVICE — IMM KIT SHOULDER TMAX MASK FACE 7210559

## (undated) DEVICE — SOL WATER IRRIG 1000ML BOTTLE 2F7114

## (undated) DEVICE — SYR BULB IRRIG 50ML LATEX FREE 0035280

## (undated) DEVICE — SU MONOCRYL 3-0 PS-1 27" Y936H

## (undated) DEVICE — ESU CLEANER TIP 31142717

## (undated) DEVICE — RESTRAINT LIMB HOLDER ANKLE/WRIST FOAM W/QUICK RELEASE 2533

## (undated) DEVICE — SU FIBERWIRE 2 38"  AR-7200

## (undated) DEVICE — SUCTION MANIFOLD DORNOCH ULTRA CART UL-CL500

## (undated) DEVICE — SU ETHIBOND 0 CT-1 CR 8X18" CX21D

## (undated) DEVICE — Device

## (undated) DEVICE — GLOVE PROTEXIS POWDER FREE 7.5 ORTHOPEDIC 2D73ET75

## (undated) DEVICE — BLADE SAW SAGITTAL STRK 18X75X0.89MM HD SYS 6 6118-089-075

## (undated) DEVICE — GUIDE WIRE

## (undated) DEVICE — STRAP KNEE/BODY 31143004

## (undated) DEVICE — COVER CAMERA IN-LIGHT DISP LT-C02

## (undated) DEVICE — PREP DURAPREP 26ML APL 8630

## (undated) DEVICE — ESU ELEC NDL 1" E1552

## (undated) DEVICE — SOL NACL 0.9% IRRIG 1000ML BOTTLE 2F7124

## (undated) DEVICE — DRSG STERI STRIP 1/2X4" R1547

## (undated) DEVICE — DRSG AQUACEL AG 3.5X6.0" HYDROFIBER 412010

## (undated) DEVICE — LINEN ORTHO PACK 5446

## (undated) DEVICE — DRAPE POUCH INSTRUMENT 1018

## (undated) DEVICE — BRUSH SURGICAL SCRUB W/PCMX 4457A

## (undated) DEVICE — SOL NACL 0.9% IRRIG 3000ML BAG 2B7477

## (undated) DEVICE — SPONGE LAP 18X18" X8435

## (undated) RX ORDER — KETOROLAC TROMETHAMINE 30 MG/ML
INJECTION, SOLUTION INTRAMUSCULAR; INTRAVENOUS
Status: DISPENSED
Start: 2021-01-19

## (undated) RX ORDER — FENTANYL CITRATE 50 UG/ML
INJECTION, SOLUTION INTRAMUSCULAR; INTRAVENOUS
Status: DISPENSED
Start: 2021-01-19

## (undated) RX ORDER — CEFAZOLIN SODIUM 2 G/100ML
INJECTION, SOLUTION INTRAVENOUS
Status: DISPENSED
Start: 2021-01-19

## (undated) RX ORDER — CEFAZOLIN SODIUM 1 G/3ML
INJECTION, POWDER, FOR SOLUTION INTRAMUSCULAR; INTRAVENOUS
Status: DISPENSED
Start: 2021-01-19

## (undated) RX ORDER — PROPOFOL 10 MG/ML
INJECTION, EMULSION INTRAVENOUS
Status: DISPENSED
Start: 2021-01-19

## (undated) RX ORDER — EPHEDRINE SULFATE 50 MG/ML
INJECTION, SOLUTION INTRAMUSCULAR; INTRAVENOUS; SUBCUTANEOUS
Status: DISPENSED
Start: 2021-01-19

## (undated) RX ORDER — ONDANSETRON 2 MG/ML
INJECTION INTRAMUSCULAR; INTRAVENOUS
Status: DISPENSED
Start: 2021-01-19

## (undated) RX ORDER — HYDROMORPHONE HYDROCHLORIDE 1 MG/ML
INJECTION, SOLUTION INTRAMUSCULAR; INTRAVENOUS; SUBCUTANEOUS
Status: DISPENSED
Start: 2021-01-19

## (undated) RX ORDER — ACETAMINOPHEN 325 MG/1
TABLET ORAL
Status: DISPENSED
Start: 2021-01-19

## (undated) RX ORDER — FENTANYL CITRATE-0.9 % NACL/PF 10 MCG/ML
PLASTIC BAG, INJECTION (ML) INTRAVENOUS
Status: DISPENSED
Start: 2021-01-19

## (undated) RX ORDER — LIDOCAINE HYDROCHLORIDE 20 MG/ML
INJECTION, SOLUTION EPIDURAL; INFILTRATION; INTRACAUDAL; PERINEURAL
Status: DISPENSED
Start: 2021-01-19